# Patient Record
Sex: MALE | Race: WHITE | NOT HISPANIC OR LATINO | Employment: OTHER | URBAN - METROPOLITAN AREA
[De-identification: names, ages, dates, MRNs, and addresses within clinical notes are randomized per-mention and may not be internally consistent; named-entity substitution may affect disease eponyms.]

---

## 2017-01-12 ENCOUNTER — COMMUNICATION - HEALTHEAST (OUTPATIENT)
Dept: INTERNAL MEDICINE | Facility: CLINIC | Age: 57
End: 2017-01-12

## 2017-01-17 ENCOUNTER — RECORDS - HEALTHEAST (OUTPATIENT)
Dept: ADMINISTRATIVE | Facility: OTHER | Age: 57
End: 2017-01-17

## 2017-01-17 ENCOUNTER — MEDICAL CORRESPONDENCE (OUTPATIENT)
Dept: HEALTH INFORMATION MANAGEMENT | Facility: CLINIC | Age: 57
End: 2017-01-17

## 2017-01-17 ENCOUNTER — TRANSFERRED RECORDS (OUTPATIENT)
Dept: HEALTH INFORMATION MANAGEMENT | Facility: CLINIC | Age: 57
End: 2017-01-17

## 2017-02-27 ENCOUNTER — COMMUNICATION - HEALTHEAST (OUTPATIENT)
Dept: INTERNAL MEDICINE | Facility: CLINIC | Age: 57
End: 2017-02-27

## 2017-02-28 ENCOUNTER — COMMUNICATION - HEALTHEAST (OUTPATIENT)
Dept: NEUROSURGERY | Facility: CLINIC | Age: 57
End: 2017-02-28

## 2017-04-11 ENCOUNTER — PRE VISIT (OUTPATIENT)
Dept: NEUROLOGY | Facility: CLINIC | Age: 57
End: 2017-04-11

## 2017-04-11 NOTE — TELEPHONE ENCOUNTER
Records received from [facility].   Included  Office notes: 8/23/12, 4/4/14, 4/22/14, 12/9/16, 1/17/17  Radiology reports: MRI lumbar on 8/28/15   MRI right foot on 8/28/15  Other: EMG on 4/16/12, 1/17/17, labs- [facility]

## 2017-04-11 NOTE — TELEPHONE ENCOUNTER
1.  Date/reason for appt:4/19/17, Neuropathy  2.  Referring provider: RONA LAY  3.  Call to patient (Yes / No - short description):No, referred  4.  Previous care at / records requested from:   [facility]- faxed cover sheet.

## 2017-04-18 ASSESSMENT — ENCOUNTER SYMPTOMS
MYALGIAS: 1
SPEECH CHANGE: 0
NIGHT SWEATS: 1
HEADACHES: 1
POLYPHAGIA: 0
STIFFNESS: 1
MUSCLE WEAKNESS: 1
WEIGHT LOSS: 0
NECK PAIN: 0
TINGLING: 1
ARTHRALGIAS: 1
SEIZURES: 0
LOSS OF CONSCIOUSNESS: 0
DIZZINESS: 0
FATIGUE: 1
DECREASED APPETITE: 0
HALLUCINATIONS: 0
WEAKNESS: 1
CHILLS: 0
JOINT SWELLING: 0
WEIGHT GAIN: 0
INCREASED ENERGY: 1
NUMBNESS: 1
FEVER: 0
BACK PAIN: 1
ALTERED TEMPERATURE REGULATION: 0
DISTURBANCES IN COORDINATION: 0
MUSCLE CRAMPS: 1
MEMORY LOSS: 0
TREMORS: 0
POLYDIPSIA: 0
PARALYSIS: 0

## 2017-04-18 NOTE — TELEPHONE ENCOUNTER
Additional records received from [facility].   Included  Office notes: duplicate office notes  Radiology reports: MR Lumbar on 8/28/15- [facility]   MR right foot on 8/28/15- [facility]

## 2017-04-19 ENCOUNTER — OFFICE VISIT (OUTPATIENT)
Dept: NEUROLOGY | Facility: CLINIC | Age: 57
End: 2017-04-19

## 2017-04-19 VITALS
HEIGHT: 72 IN | BODY MASS INDEX: 31.02 KG/M2 | DIASTOLIC BLOOD PRESSURE: 77 MMHG | WEIGHT: 229 LBS | HEART RATE: 76 BPM | SYSTOLIC BLOOD PRESSURE: 112 MMHG

## 2017-04-19 DIAGNOSIS — M54.17 LUMBOSACRAL RADICULOPATHY AT L4: ICD-10-CM

## 2017-04-19 DIAGNOSIS — G60.9 HEREDITARY AND IDIOPATHIC PERIPHERAL NEUROPATHY: ICD-10-CM

## 2017-04-19 DIAGNOSIS — G56.23 ULNAR NEUROPATHY OF BOTH UPPER EXTREMITIES: ICD-10-CM

## 2017-04-19 DIAGNOSIS — G60.9 HEREDITARY AND IDIOPATHIC PERIPHERAL NEUROPATHY: Primary | ICD-10-CM

## 2017-04-19 LAB
ALBUMIN SERPL-MCNC: 3.6 G/DL (ref 3.4–5)
ALP SERPL-CCNC: 103 U/L (ref 40–150)
ALT SERPL W P-5'-P-CCNC: 28 U/L (ref 0–70)
ANION GAP SERPL CALCULATED.3IONS-SCNC: 8 MMOL/L (ref 3–14)
AST SERPL W P-5'-P-CCNC: 24 U/L (ref 0–45)
BILIRUB SERPL-MCNC: 0.6 MG/DL (ref 0.2–1.3)
BUN SERPL-MCNC: 21 MG/DL (ref 7–30)
CALCIUM SERPL-MCNC: 8.7 MG/DL (ref 8.5–10.1)
CHLORIDE SERPL-SCNC: 106 MMOL/L (ref 94–109)
CO2 SERPL-SCNC: 25 MMOL/L (ref 20–32)
CREAT SERPL-MCNC: 0.84 MG/DL (ref 0.66–1.25)
CRP SERPL-MCNC: 16 MG/L (ref 0–8)
GFR SERPL CREATININE-BSD FRML MDRD: ABNORMAL ML/MIN/1.7M2
GLUCOSE SERPL-MCNC: 104 MG/DL (ref 70–99)
HCV AB SERPL QL IA: NORMAL
POTASSIUM SERPL-SCNC: 3.9 MMOL/L (ref 3.4–5.3)
PROT SERPL-MCNC: 7.6 G/DL (ref 6.8–8.8)
SODIUM SERPL-SCNC: 140 MMOL/L (ref 133–144)
T4 FREE SERPL-MCNC: 1.23 NG/DL (ref 0.76–1.46)
TSH SERPL DL<=0.005 MIU/L-ACNC: 0.21 MU/L (ref 0.4–4)
VIT B12 SERPL-MCNC: 492 PG/ML (ref 193–986)

## 2017-04-19 RX ORDER — PREGABALIN 300 MG/1
CAPSULE ORAL
COMMUNITY
End: 2023-03-08

## 2017-04-19 RX ORDER — CHOLECALCIFEROL (VITAMIN D3) 25 MCG
CAPSULE ORAL
COMMUNITY

## 2017-04-19 RX ORDER — LEVOTHYROXINE SODIUM 137 UG/1
TABLET ORAL
COMMUNITY
End: 2021-12-21

## 2017-04-19 RX ORDER — LOSARTAN POTASSIUM AND HYDROCHLOROTHIAZIDE 12.5; 5 MG/1; MG/1
TABLET ORAL
COMMUNITY
End: 2021-10-04

## 2017-04-19 RX ORDER — SIMVASTATIN 40 MG
TABLET ORAL
COMMUNITY
End: 2021-10-20

## 2017-04-19 ASSESSMENT — PAIN SCALES - GENERAL: PAINLEVEL: MILD PAIN (3)

## 2017-04-19 NOTE — LETTER
2017       RE: Raj Nolan   [facility] [address]     Dear Colleague,    Thank you for referring your patient, Raj Nolan, to the [facility]UR[facility] at [facility]. Please see a copy of my visit note below.    2017      Pb Poon MD   [place] [address]      RE: Rja Nolan   MRN: [id number]   : 1960      Dear Dr. Poon:      I had the pleasure to see Mr. Nolan at the [facility].  Thank you for this referral.      As you know, he is a 56-year-old man whose chief complaint is numbness and pain at the feet. Numbness began about 8-10 years ago, first from the right foot.  Subsequently, the left was involved, probably a couple of years later. During the first few years he did not have prominent pain, to my understanding. It does not appear that the numbness has progressed over the years; it has stayed from the toes to the level of metatarsals, and it has not spread to the skin of the ankles or the lower calf. As for foot pain, he describes a dull, achy sensation principally at the right foot, rather than a burning, sharp or electric one. He has tingling at the toes, including at night.  On , he had extensive surgery at the right foot including repair of metatarsal fractures, and correction of deformity secondary to osteoarthritis.  Surgery helped the foot pain a little bit but did not help the numbness, of course. He underwent fusion of his tarsometatarsal joints at third, fourth and fifth toe.  He had issues with poor wound healing, nonunion, and angular displacement of the joints.  He then had bilateral hip and knee replacements: right hip in 2014, total left knee in 2015, total right knee in 2015, and left hip in 2016. He uses Lyrica 100 mg t.i.d. to keep his painful symptoms under control. He also reports an  occasional sharp pain and numbness in the right anterior thigh.  His right knee does not give away when walking and he has not had any falls.  He has intense back pain.  He has been evaluated for the latter problem twice, first in 2015, and second a few weeks ago. I do not have the records from the last visit.  During the 2015 encounter with a spine surgeon, an MRI of the lumbar spine was performed, which showed substantial lumbar spine stenosis, labeled severe at L3-L4 and L4-L5 and moderate at L2-L3.  It had progressed since 2009.  There was a new right foraminal to extraforaminal L3-L4 disk extrusion compressing the L3 ganglion and severe right foraminal stenosis at L4-L5, multilevel hypertrophic facet arthropathy and epidural soft tissue behind T11 compressing the cord.  At that time, the surgeon did not feel that this needs to be pursued more aggressively and referred him to a podiatrist.     Lastly,he reports numbness in the 4th and 5th digits of bilateral upper extremities.  He does not acknowledge any provocating or alleviating factor for those.  He has a history of bilateral carpal tunnel surgery release in the past.  He does not have numbness in digits 1-3 anymore.  He denies sensory symptoms in the lips, face, trunk, perineum.  He has no bowel or bladder incontinence.  He has no severe neck pain and no cranial nerve symptoms.      REVIEW OF PRIOR WORKUP: He has had 2 EMGs through your office.  I received both for review, one from 2012 and the second one from 2017.  I have the following to comment:   On both studies, there was an asymmetry of the peroneal motor responses.  In 2012, the left was 1.7 mV, the right was 4.6.  Conduction velocities were all normal.  Tibial motor amplitudes were 7.5 millivolts amplitude on the left and 4.8 on the right in 2012 with normal velocities.  In 2017, left tibial motor response was 5.4 millivolts, right 3.7, slightly reduced from prior, and the asymmetry in the peroneal  persists but interestingly the right now is smaller than the left, the left being 6.1, the right 1.8.  Of interest, sural sensory nerve conduction studies were normal on both occasions.  On 2012, they were 11 microvolts left, 13 right.  On 2017, the sural was 9.3 on the right.  Our lab normal value is more than 8.  The left superficial peroneal sensory nerve at the ankle was studied in 2017 and was 8.9 microvolts, the right was absent.  Of note, he has scars on the right foot exactly over the territory of the right peroneal sensory nerve.  Needle EMG in 2012 was relatively unremarkable except for slight changes with large long duration units and slightly reduced recruitment at tibialis anterior, posterior and adductor hallucis.  The 2017 EMG, however, was more remarkable with substantial paraspinous denervation and denervation of the right quadriceps with 3+ fibs and moderately reduced recruitment of large, long duration units.  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  I have a somewhat different opinion (see below).    Workup for neuropathy included TSH, immunofixation and sed rate a number of years ago. TSH was markedly elevated at 23, he was diagnosed with hypothyroidism, received Synthroid treatment and per your notes it is well controlled, but I have not seen a repeat TSH.  ESR and immunofixation were normal. I  did not find a B12, diabetes workup or even a metabolic panel in the medical record.      PAST MEDICAL HISTORY:   Hypertension, hypothyroidism, vitamin D deficiency, hyperlipidemia, bilateral hip and knee replacement, right foot surgery for metatarsal fracture or arthritis, bilateral carpal tunnel release, right quadriceps tendon rupture and repair of a tear in 12/2008.      ALLERGIES:  NKDA.      REVIEW OF SYSTEMS:  A 14-point review is negative except per HPI.      FAMILY HISTORY:  Negative for neuropathy.  His father had osteoarthritis and underwent bilateral hip and knee replacement.   Mother had hypertension.      SOCIAL HISTORY:  No smoking.  Drinks 7-10 alcoholic drinks a week, mixed between beer, wine and cocktails.      CURRENT MEDICATIONS:   As outlined in Epic record.      PHYSICAL EXAMINATION:   ROOMING VITAL SIGNS:  Blood pressure 112/77.  Pulse 76 and regular.  He weighs 103.9 kilos.  Height is 183.  He endorsed mild pain at the foot 3/10.   NEUROLOGIC:  He is awake, alert, oriented x3.  Cranial nerve examination II-XII are normal.  Motor exam shows normal muscle strength, tone and bulk practically throughout except perhaps for the right great toe that shows some difficulty with extension.  It is 4/5.  The toe is very stiff and passive range of motion is limited.  He has obvious exostosis and swelling over the right carpometacarpal joint of the thumb and he has quite some arthritis of the foot as well and limited motion of the toes.  His tone is normal throughout.  Reflexes are 2 in the upper extremities, absent at the knees and right ankle.  Left ankle reflex is trace to 1+.  Plantar responses are bilaterally neutral or flexor.  Sensory exam is slightly abnormal.  Vibration is 4 seconds at the right great toe, but interestingly 10 at the left on more than 1 attempt which is normal.  Vibration at the right medial malleolus is 4 seconds and the left is 7-8 seconds, which is slightly reduced.  Position sensation is normal.  Pin is reduced from the foot to the lower third of the calf in a low stocking distribution bilaterally and he has some trophic changes with shiny skin and loss of hair from the lower third of the calf down to the foot.  There is no red discoloration.  Pulses are normal.  Finger-to-nose is done without dysmetria.  There is no tremor or adventitious movement.  He is able to get up from a chair without assistance.  He can walk a few steps of tandem with reasonable stability.  Romberg sign is negative.      IMPRESSION:     1.  Mild, non-progressive, and probably idiopathic,  sensory neuropathy, limited to the feet, actually not evident on EMG.   2.  Right L3-L4 radiculopathy.   3.  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        I spent about 45-50 minutes with Mr. Nolan, of which more than half was counseling.  He presents with the above list of problems.   In my review of the EMG studies, I noted that the sural sensory responses were normal on both occasions.  I would not call that sensory neuropathy because large sensory fibers appear intact.  The absence of the right superficial peroneal sensory response is likely related to scarring and trauma from surgeries on the top of the foot as opposed to neuropathy. Confirmation of neuropathy diagnosis may require a skin biopsy in this case, but I really do not think it is necessary because I am satisfied with the clinical exam findings.  Importantly, I explained to the patient that almost 50% of those small fiber or mild large-fiber sensory neuropathies that develop over 8-10 years and do not progress are idiopathic, i.e., no good explanation is found.  That said, a few more blood tests should be done, most importantly an oral glucose tolerance test, which I ordered.  I explained the significance of prediabetes and diabetes diagnosis and the relationship to neuropathy.  I also think he should get vitamin B12, a repeat TSH, CRP, BEBETO, SSA, SSB antibodies, hepatitis C serologies and a B6.  I will have the patient do those tests and contact him with the results.  If there is anything abnormal, then we may be able to take specific action.  If there is nothing abnormal, however, I explained to him that no intervention will make the foot numbness disappear.  He will have to live with this symptom and it is likely going to be permanent.  On the other hand, the prognosis of idiopathic sensory neuropathy is generally good and I told him that it is unlikely that the numbness will spread more proximally or cause substantial disability  in the ensuing years.   Hypothyroidism could be a culprit for the neuropathy, but it is probably not a sufficient explanation, because it was corrected and symptoms persist. I also asked the patient to reduce his alcohol intake. I would not say he has alcoholic neuropathy, but I generally advise my patients with neuropathy due to any cause to drink less than 1 alcoholic drink a day.    Symptoms in the hands likely reflect ulnar neuropathies.  I really do not think any further action is required for this right now other than adopting a straight elbow position, avoiding excessive flexion or wearing a protective pad.  He does have lumbar spine stenosis and it is manifesting with right L3-L4 radiculopathy, both clinically and electrodiagnostically.  I told him that I do not want to comment on the necessity of lumbar spine surgery and this should be discussed between him and his surgeon.  I told him, however, that I do not expect any procedure done at his lumbar spine to provide relief of his foot paresthesias.  He understands that.      Thank you for allowing me to participate in his care.      Sincerely,       Pierre Soler MD      D: 2017 09:52   T: 2017 15:26   MT: AIDE      Name:     LEEROY NG   MRN:      [id number]        Account:      [id number]   :      1960           Service Date: 2017      Document: [id number]

## 2017-04-19 NOTE — TELEPHONE ENCOUNTER
Spoke to Joselin at [facility]- Lumbar images belong to [facility].  Spoke to Divine at [facility] and she state they need pt to sign ANSHUL, Please have pt sign ANSHUL for MR Lumbar.

## 2017-04-19 NOTE — PROGRESS NOTES
2017      Pb Poon MD   [place] [address]      RE: Raj Nolan   MRN: [id number]   : 1960      Dear Dr. Poon:      I had the pleasure to see Mr. Nolan at the [facility].  Thank you for this referral.      As you know, he is a 56-year-old man whose chief complaint is numbness and pain at the feet. Numbness began about 8-10 years ago, first from the right foot.  Subsequently, the left was involved, probably a couple of years later. During the first few years he did not have prominent pain, to my understanding. It does not appear that the numbness has progressed over the years; it has stayed from the toes to the level of metatarsals, and it has not spread to the skin of the ankles or the lower calf. As for foot pain, he describes a dull, achy sensation principally at the right foot, rather than a burning, sharp or electric one. He has tingling at the toes, including at night.  On , he had extensive surgery at the right foot including repair of metatarsal fractures, and correction of deformity secondary to osteoarthritis.  Surgery helped the foot pain a little bit but did not help the numbness, of course. He underwent fusion of his tarsometatarsal joints at third, fourth and fifth toe.  He had issues with poor wound healing, nonunion, and angular displacement of the joints.  He then had bilateral hip and knee replacements: right hip in 2014, total left knee in 2015, total right knee in 2015, and left hip in 2016. He uses Lyrica 100 mg t.i.d. to keep his painful symptoms under control. He also reports an occasional sharp pain and numbness in the right anterior thigh.  His right knee does not give away when walking and he has not had any falls.  He has intense back pain.  He has been evaluated for the latter problem twice, first in , and second a few weeks ago. I do not have the  records from the last visit.  During the 2015 encounter with a spine surgeon, an MRI of the lumbar spine was performed, which showed substantial lumbar spine stenosis, labeled severe at L3-L4 and L4-L5 and moderate at L2-L3.  It had progressed since 2009.  There was a new right foraminal to extraforaminal L3-L4 disk extrusion compressing the L3 ganglion and severe right foraminal stenosis at L4-L5, multilevel hypertrophic facet arthropathy and epidural soft tissue behind T11 compressing the cord.  At that time, the surgeon did not feel that this needs to be pursued more aggressively and referred him to a podiatrist.     Lastly,he reports numbness in the 4th and 5th digits of bilateral upper extremities.  He does not acknowledge any provocating or alleviating factor for those.  He has a history of bilateral carpal tunnel surgery release in the past.  He does not have numbness in digits 1-3 anymore.  He denies sensory symptoms in the lips, face, trunk, perineum.  He has no bowel or bladder incontinence.  He has no severe neck pain and no cranial nerve symptoms.      REVIEW OF PRIOR WORKUP: He has had 2 EMGs through your office.  I received both for review, one from 2012 and the second one from 2017.  I have the following to comment:   On both studies, there was an asymmetry of the peroneal motor responses.  In 2012, the left was 1.7 mV, the right was 4.6.  Conduction velocities were all normal.  Tibial motor amplitudes were 7.5 millivolts amplitude on the left and 4.8 on the right in 2012 with normal velocities.  In 2017, left tibial motor response was 5.4 millivolts, right 3.7, slightly reduced from prior, and the asymmetry in the peroneal persists but interestingly the right now is smaller than the left, the left being 6.1, the right 1.8.  Of interest, sural sensory nerve conduction studies were normal on both occasions.  On 2012, they were 11 microvolts left, 13 right.  On 2017, the sural was 9.3 on the right.  Our  lab normal value is more than 8.  The left superficial peroneal sensory nerve at the ankle was studied in 2017 and was 8.9 microvolts, the right was absent.  Of note, he has scars on the right foot exactly over the territory of the right peroneal sensory nerve.  Needle EMG in 2012 was relatively unremarkable except for slight changes with large long duration units and slightly reduced recruitment at tibialis anterior, posterior and adductor hallucis.  The 2017 EMG, however, was more remarkable with substantial paraspinous denervation and denervation of the right quadriceps with 3+ fibs and moderately reduced recruitment of large, long duration units.  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  I have a somewhat different opinion (see below).    Workup for neuropathy included TSH, immunofixation and sed rate a number of years ago. TSH was markedly elevated at 23, he was diagnosed with hypothyroidism, received Synthroid treatment and per your notes it is well controlled, but I have not seen a repeat TSH.  ESR and immunofixation were normal. I  did not find a B12, diabetes workup or even a metabolic panel in the medical record.      PAST MEDICAL HISTORY:   Hypertension, hypothyroidism, vitamin D deficiency, hyperlipidemia, bilateral hip and knee replacement, right foot surgery for metatarsal fracture or arthritis, bilateral carpal tunnel release, right quadriceps tendon rupture and repair of a tear in 12/2008.      ALLERGIES:  NKDA.      REVIEW OF SYSTEMS:  A 14-point review is negative except per HPI.      FAMILY HISTORY:  Negative for neuropathy.  His father had osteoarthritis and underwent bilateral hip and knee replacement.  Mother had hypertension.      SOCIAL HISTORY:  No smoking.  Drinks 7-10 alcoholic drinks a week, mixed between beer, wine and cocktails.      CURRENT MEDICATIONS:   As outlined in Epic record.      PHYSICAL EXAMINATION:   ROOMING VITAL SIGNS:  Blood pressure 112/77.  Pulse 76 and  regular.  He weighs 103.9 kilos.  Height is 183.  He endorsed mild pain at the foot 3/10.   NEUROLOGIC:  He is awake, alert, oriented x3.  Cranial nerve examination II-XII are normal.  Motor exam shows normal muscle strength, tone and bulk practically throughout except perhaps for the right great toe that shows some difficulty with extension.  It is 4/5.  The toe is very stiff and passive range of motion is limited.  He has obvious exostosis and swelling over the right carpometacarpal joint of the thumb and he has quite some arthritis of the foot as well and limited motion of the toes.  His tone is normal throughout.  Reflexes are 2 in the upper extremities, absent at the knees and right ankle.  Left ankle reflex is trace to 1+.  Plantar responses are bilaterally neutral or flexor.  Sensory exam is slightly abnormal.  Vibration is 4 seconds at the right great toe, but interestingly 10 at the left on more than 1 attempt which is normal.  Vibration at the right medial malleolus is 4 seconds and the left is 7-8 seconds, which is slightly reduced.  Position sensation is normal.  Pin is reduced from the foot to the lower third of the calf in a low stocking distribution bilaterally and he has some trophic changes with shiny skin and loss of hair from the lower third of the calf down to the foot.  There is no red discoloration.  Pulses are normal.  Finger-to-nose is done without dysmetria.  There is no tremor or adventitious movement.  He is able to get up from a chair without assistance.  He can walk a few steps of tandem with reasonable stability.  Romberg sign is negative.      IMPRESSION:     1.  Mild, non-progressive, and probably idiopathic, sensory neuropathy, limited to the feet, actually not evident on EMG.   2.  Right L3-L4 radiculopathy.   3.  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        I spent about 45-50 minutes with Mr. Nolan, of which more than half was counseling.  He presents with the  above list of problems.   In my review of the EMG studies, I noted that the sural sensory responses were normal on both occasions.  I would not call that sensory neuropathy because large sensory fibers appear intact.  The absence of the right superficial peroneal sensory response is likely related to scarring and trauma from surgeries on the top of the foot as opposed to neuropathy. Confirmation of neuropathy diagnosis may require a skin biopsy in this case, but I really do not think it is necessary because I am satisfied with the clinical exam findings.  Importantly, I explained to the patient that almost 50% of those small fiber or mild large-fiber sensory neuropathies that develop over 8-10 years and do not progress are idiopathic, i.e., no good explanation is found.  That said, a few more blood tests should be done, most importantly an oral glucose tolerance test, which I ordered.  I explained the significance of prediabetes and diabetes diagnosis and the relationship to neuropathy.  I also think he should get vitamin B12, a repeat TSH, CRP, BEBETO, SSA, SSB antibodies, hepatitis C serologies and a B6.  I will have the patient do those tests and contact him with the results.  If there is anything abnormal, then we may be able to take specific action.  If there is nothing abnormal, however, I explained to him that no intervention will make the foot numbness disappear.  He will have to live with this symptom and it is likely going to be permanent.  On the other hand, the prognosis of idiopathic sensory neuropathy is generally good and I told him that it is unlikely that the numbness will spread more proximally or cause substantial disability in the ensuing years.   Hypothyroidism could be a culprit for the neuropathy, but it is probably not a sufficient explanation, because it was corrected and symptoms persist. I also asked the patient to reduce his alcohol intake. I would not say he has alcoholic neuropathy, but I  generally advise my patients with neuropathy due to any cause to drink less than 1 alcoholic drink a day.    Symptoms in the hands likely reflect ulnar neuropathies.  I really do not think any further action is required for this right now other than adopting a straight elbow position, avoiding excessive flexion or wearing a protective pad.  He does have lumbar spine stenosis and it is manifesting with right L3-L4 radiculopathy, both clinically and electrodiagnostically.  I told him that I do not want to comment on the necessity of lumbar spine surgery and this should be discussed between him and his surgeon.  I told him, however, that I do not expect any procedure done at his lumbar spine to provide relief of his foot paresthesias.  He understands that.      Thank you for allowing me to participate in his care.      Sincerely,       MD LEA Polo MD             D: 2017 09:52   T: 2017 15:26   MT: AIDE      Name:     LEEROY NG   MRN:      [id number]        Account:      [id number]   :      1960           Service Date: 2017      Document: [id number]

## 2017-04-19 NOTE — MR AVS SNAPSHOT
After Visit Summary   4/19/2017    Raj Nolan    MRN: [id number]           Patient Information     Date Of Birth          1960        Visit Information        Provider Department      4/19/2017 7:50 AM Pierre Soler MD [facility]        Today's Diagnoses     Hereditary and idiopathic peripheral neuropathy    -  1      Care Instructions    Thank you for choosing [facility] for your care.    The physician's recommendations are as follows:    1: You probably have mild neuropathy in your feet  2: Follow up with Dr. Poon  3: Have labs drawn today  4: Complete an Oral Glucose Tolerance Test at your convenience when you are fasting  5: Please contact your Spine Surgeon to address your back pain issues  6: Your numbness in your fingers is from nerve impingement at your elbows.  Try to keep your arms straight or use protective pads on your elbows.    Please do not hesitate to call with questions or concerns [phone], choose option 3 to speak to a nurse.              Follow-ups after your visit        Future tests that were ordered for you today     Open Future Orders        Priority Expected Expires Ordered    Glucose tolerance std non preg Routine  4/19/2018 4/19/2017    Vitamin B12 Routine  4/19/2018 4/19/2017    TSH with free T4 reflex Routine  4/19/2018 4/19/2017    Comprehensive metabolic panel Routine  4/19/2018 4/19/2017    CRP inflammation Routine  4/19/2018 4/19/2017    Antinuclear antibody screen by EIA Routine  4/19/2018 4/19/2017    SSA Ro LARS Antibody IgG Routine  4/19/2018 4/19/2017    SSB La LARS Antibody IgG Routine  4/19/2018 4/19/2017    Hepatitis C antibody Routine  4/19/2018 4/19/2017    Vitamin B6 Routine  4/19/2018 4/19/2017            Who to contact     Please call your clinic at [phone] to:    Ask questions about your health    Make or cancel appointments    Discuss your medicines    Learn about your test results    Speak to your  doctor   If you have compliments or concerns about an experience at your clinic, or if you wish to file a complaint, please contact [facility] Patient Relations at [phone] or email us at [email or web address]         Additional Information About Your Visit        MyChart Information     [employer] [employer] gives you secure access to your electronic health record. If you see a primary care provider, you can also send messages to your care team and make appointments. If you have questions, please call your primary care clinic.  If you do not have a primary care provider, please call [phone] and they will assist you.      [employer] is an electronic gateway that provides easy, online access to your medical records. With [employer], you can request a clinic appointment, read your test results, renew a prescription or communicate with your care team.     To access your existing account, please contact your [facility] or call [phone] for assistance.        Care EveryWhere ID     This is your Care EveryWhere ID. This could be used by other organizations to access your [facility] medical records  [id number]        Your Vitals Were     Pulse Height BMI (Body Mass Index)             76 1.829 m (6') 31.06 kg/m2          Blood Pressure from Last 3 Encounters:   04/19/17 112/77    Weight from Last 3 Encounters:   04/19/17 103.9 kg (229 lb)               Primary Care Provider    None       No address on file        Thank you!     Thank you for choosing [facility]  for your care. Our goal is always to provide you with excellent care. Hearing back from our patients is one way we can continue to improve our services. Please take a few minutes to complete the written survey that you may receive in the mail after your visit with us. Thank you!             Your Updated Medication List - Protect others around you: Learn how to safely use, store and throw away your  medicines at www.disposemymeds.org.          This list is accurate as of: 4/19/17  8:43 AM.  Always use your most recent med list.                   Brand Name Dispense Instructions for use    ASPIRIN ADULT LOW DOSE 81 MG EC tablet   Generic drug:  aspirin          CoQ-10 100 MG Caps          levothyroxine 137 MCG tablet    SYNTHROID/LEVOTHROID         losartan-hydrochlorothiazide 50-12.5 MG per tablet    HYZAAR         LYRICA 300 MG Caps capsule   Generic drug:  pregabalin          magnesium 100 MG Caps          simvastatin 40 MG tablet    ZOCOR         Vitamin D-3 1000 UNITS Caps

## 2017-04-19 NOTE — PATIENT INSTRUCTIONS
Thank you for choosing [facility] for your care.    The physician's recommendations are as follows:    1: You probably have mild neuropathy in your feet  2: Follow up with Dr. Poon  3: Have labs drawn today  4: Complete an Oral Glucose Tolerance Test at your convenience when you are fasting  5: Please contact your Spine Surgeon to address your back pain issues  6: Your numbness in your fingers is from nerve impingement at your elbows.  Try to keep your arms straight or use protective pads on your elbows.    Please do not hesitate to call with questions or concerns [phone], choose option 3 to speak to a nurse.

## 2017-04-20 LAB
ANA SER QL IA: NORMAL
ENA SS-A IGG SER IA-ACNC: NORMAL AI (ref 0–0.9)
ENA SS-B IGG SER IA-ACNC: NORMAL AI (ref 0–0.9)

## 2017-04-20 NOTE — PROGRESS NOTES
Answers for HPI/ROS submitted by the patient on 4/18/2017   General Symptoms: Yes  Skin Symptoms: No  HENT Symptoms: No  EYE SYMPTOMS: No  HEART SYMPTOMS: No  LUNG SYMPTOMS: No  INTESTINAL SYMPTOMS: No  URINARY SYMPTOMS: No  REPRODUCTIVE SYMPTOMS: No  SKELETAL SYMPTOMS: Yes  BLOOD SYMPTOMS: No  NERVOUS SYSTEM SYMPTOMS: Yes  MENTAL HEALTH SYMPTOMS: No  Fever: No  Loss of appetite: No  Weight loss: No  Weight gain: No  Fatigue: Yes  Night sweats: Yes  Chills: No  Increased stress: Yes  Excessive hunger: No  Excessive thirst: No  Feeling hot or cold when others believe the temperature is normal: No  Loss of height: Yes  Post-operative complications: No  Surgical site pain: No  Hallucinations: No  Change in or Loss of Energy: Yes  Hyperactivity: No  Confusion: No  Back pain: Yes  Muscle aches: Yes  Neck pain: No  Swollen joints: No  Joint pain: Yes  Bone pain: Yes  Muscle cramps: Yes  Muscle weakness: Yes  Joint stiffness: Yes  Bone fracture: No  Trouble with coordination: No  Dizziness or trouble with balance: No  Fainting or black-out spells: No  Memory loss: No  Headache: Yes  Seizures: No  Speech problems: No  Tingling: Yes  Tremor: No  Weakness: Yes  Difficulty walking: Yes  Paralysis: No  Numbness: Yes

## 2017-04-22 LAB — VIT B6 SERPL-MCNC: 53.4 NG/ML

## 2017-05-01 ENCOUNTER — COMMUNICATION - HEALTHEAST (OUTPATIENT)
Dept: INTERNAL MEDICINE | Facility: CLINIC | Age: 57
End: 2017-05-01

## 2017-05-01 ENCOUNTER — TRANSFERRED RECORDS (OUTPATIENT)
Dept: HEALTH INFORMATION MANAGEMENT | Facility: CLINIC | Age: 57
End: 2017-05-01

## 2017-05-01 ENCOUNTER — AMBULATORY - HEALTHEAST (OUTPATIENT)
Dept: LAB | Facility: CLINIC | Age: 57
End: 2017-05-01

## 2017-05-01 DIAGNOSIS — G60.9 UNSPECIFIED HEREDITARY AND IDIOPATHIC PERIPHERAL NEUROPATHY: ICD-10-CM

## 2017-06-13 ENCOUNTER — COMMUNICATION - HEALTHEAST (OUTPATIENT)
Dept: INTERNAL MEDICINE | Facility: CLINIC | Age: 57
End: 2017-06-13

## 2017-06-15 ENCOUNTER — RECORDS - HEALTHEAST (OUTPATIENT)
Dept: ADMINISTRATIVE | Facility: OTHER | Age: 57
End: 2017-06-15

## 2017-08-09 ENCOUNTER — COMMUNICATION - HEALTHEAST (OUTPATIENT)
Dept: INTERNAL MEDICINE | Facility: CLINIC | Age: 57
End: 2017-08-09

## 2017-08-28 ENCOUNTER — COMMUNICATION - HEALTHEAST (OUTPATIENT)
Dept: INTERNAL MEDICINE | Facility: CLINIC | Age: 57
End: 2017-08-28

## 2017-08-28 DIAGNOSIS — E78.5 HYPERLIPIDEMIA: ICD-10-CM

## 2017-09-05 ENCOUNTER — RECORDS - HEALTHEAST (OUTPATIENT)
Dept: ADMINISTRATIVE | Facility: OTHER | Age: 57
End: 2017-09-05

## 2017-10-07 ENCOUNTER — COMMUNICATION - HEALTHEAST (OUTPATIENT)
Dept: INTERNAL MEDICINE | Facility: CLINIC | Age: 57
End: 2017-10-07

## 2017-10-07 DIAGNOSIS — E78.5 HYPERLIPIDEMIA: ICD-10-CM

## 2017-10-07 DIAGNOSIS — I10 HTN (HYPERTENSION): ICD-10-CM

## 2017-10-07 DIAGNOSIS — E03.9 HYPOTHYROID: ICD-10-CM

## 2017-10-08 ENCOUNTER — AMBULATORY - HEALTHEAST (OUTPATIENT)
Dept: INTERNAL MEDICINE | Facility: CLINIC | Age: 57
End: 2017-10-08

## 2017-11-30 ENCOUNTER — COMMUNICATION - HEALTHEAST (OUTPATIENT)
Dept: INTERNAL MEDICINE | Facility: CLINIC | Age: 57
End: 2017-11-30

## 2017-11-30 DIAGNOSIS — E03.9 HYPOTHYROIDISM: ICD-10-CM

## 2017-11-30 DIAGNOSIS — E78.5 HYPERLIPIDEMIA: ICD-10-CM

## 2017-11-30 DIAGNOSIS — R52 PAIN: ICD-10-CM

## 2017-11-30 DIAGNOSIS — I10 HTN (HYPERTENSION): ICD-10-CM

## 2017-12-18 ENCOUNTER — RECORDS - HEALTHEAST (OUTPATIENT)
Dept: ADMINISTRATIVE | Facility: OTHER | Age: 57
End: 2017-12-18

## 2018-01-19 ENCOUNTER — RECORDS - HEALTHEAST (OUTPATIENT)
Dept: ADMINISTRATIVE | Facility: OTHER | Age: 58
End: 2018-01-19

## 2018-03-09 ENCOUNTER — COMMUNICATION - HEALTHEAST (OUTPATIENT)
Dept: INTERNAL MEDICINE | Facility: CLINIC | Age: 58
End: 2018-03-09

## 2018-03-09 DIAGNOSIS — I10 HTN (HYPERTENSION): ICD-10-CM

## 2018-03-09 DIAGNOSIS — R52 PAIN: ICD-10-CM

## 2018-03-27 ENCOUNTER — OFFICE VISIT - HEALTHEAST (OUTPATIENT)
Dept: INTERNAL MEDICINE | Facility: CLINIC | Age: 58
End: 2018-03-27

## 2018-03-27 DIAGNOSIS — E78.00 HYPERCHOLESTEROLEMIA: ICD-10-CM

## 2018-03-27 DIAGNOSIS — I10 ESSENTIAL HYPERTENSION: ICD-10-CM

## 2018-03-27 DIAGNOSIS — M54.9 MECHANICAL BACK PAIN: ICD-10-CM

## 2018-03-27 ASSESSMENT — MIFFLIN-ST. JEOR: SCORE: 1906.23

## 2018-04-02 ENCOUNTER — RECORDS - HEALTHEAST (OUTPATIENT)
Dept: ADMINISTRATIVE | Facility: OTHER | Age: 58
End: 2018-04-02

## 2018-04-20 ENCOUNTER — AMBULATORY - HEALTHEAST (OUTPATIENT)
Dept: INTERNAL MEDICINE | Facility: CLINIC | Age: 58
End: 2018-04-20

## 2018-04-23 ENCOUNTER — OFFICE VISIT - HEALTHEAST (OUTPATIENT)
Dept: INTERNAL MEDICINE | Facility: CLINIC | Age: 58
End: 2018-04-23

## 2018-04-23 DIAGNOSIS — G47.00 INSOMNIA: ICD-10-CM

## 2018-04-23 DIAGNOSIS — B35.1 ONYCHOMYCOSIS: ICD-10-CM

## 2018-04-23 DIAGNOSIS — E78.5 HYPERLIPIDEMIA: ICD-10-CM

## 2018-04-23 DIAGNOSIS — Z00.00 ROUTINE GENERAL MEDICAL EXAMINATION AT A HEALTH CARE FACILITY: ICD-10-CM

## 2018-04-23 DIAGNOSIS — R73.09 ELEVATED HEMOGLOBIN A1C: ICD-10-CM

## 2018-04-23 DIAGNOSIS — I10 ESSENTIAL HYPERTENSION: ICD-10-CM

## 2018-04-23 DIAGNOSIS — G60.9 HEREDITARY AND IDIOPATHIC PERIPHERAL NEUROPATHY: ICD-10-CM

## 2018-04-23 DIAGNOSIS — G56.00 CARPAL TUNNEL SYNDROME: ICD-10-CM

## 2018-04-23 DIAGNOSIS — E78.00 HYPERCHOLESTEROLEMIA: ICD-10-CM

## 2018-04-23 DIAGNOSIS — M19.079 OSTEOARTHRITIS OF ANKLE OR FOOT: ICD-10-CM

## 2018-04-23 DIAGNOSIS — Z96.649 STATUS POST TOTAL REPLACEMENT OF HIP, UNSPECIFIED LATERALITY: ICD-10-CM

## 2018-04-23 DIAGNOSIS — E34.9 TESTOSTERONE DEFICIENCY: ICD-10-CM

## 2018-04-23 DIAGNOSIS — E03.8 OTHER SPECIFIED HYPOTHYROIDISM: ICD-10-CM

## 2018-04-23 LAB
ALBUMIN SERPL-MCNC: 3.9 G/DL (ref 3.5–5)
ALP SERPL-CCNC: 94 U/L (ref 45–120)
ALT SERPL W P-5'-P-CCNC: 32 U/L (ref 0–45)
ANION GAP SERPL CALCULATED.3IONS-SCNC: 11 MMOL/L (ref 5–18)
AST SERPL W P-5'-P-CCNC: 33 U/L (ref 0–40)
BILIRUB SERPL-MCNC: 0.6 MG/DL (ref 0–1)
BUN SERPL-MCNC: 21 MG/DL (ref 8–22)
CALCIUM SERPL-MCNC: 10 MG/DL (ref 8.5–10.5)
CHLORIDE BLD-SCNC: 109 MMOL/L (ref 98–107)
CHOLEST SERPL-MCNC: 155 MG/DL
CO2 SERPL-SCNC: 23 MMOL/L (ref 22–31)
CREAT SERPL-MCNC: 0.97 MG/DL (ref 0.7–1.3)
ERYTHROCYTE [DISTWIDTH] IN BLOOD BY AUTOMATED COUNT: 13.1 % (ref 11–14.5)
FASTING STATUS PATIENT QL REPORTED: YES
GFR SERPL CREATININE-BSD FRML MDRD: >60 ML/MIN/1.73M2
GLUCOSE BLD-MCNC: 99 MG/DL (ref 70–125)
HBA1C MFR BLD: 6.7 % (ref 3.5–6)
HCT VFR BLD AUTO: 43.8 % (ref 40–54)
HDLC SERPL-MCNC: 47 MG/DL
HGB BLD-MCNC: 14.7 G/DL (ref 14–18)
LDLC SERPL CALC-MCNC: 88 MG/DL
MCH RBC QN AUTO: 31 PG (ref 27–34)
MCHC RBC AUTO-ENTMCNC: 33.5 G/DL (ref 32–36)
MCV RBC AUTO: 93 FL (ref 80–100)
PLATELET # BLD AUTO: 215 THOU/UL (ref 140–440)
PMV BLD AUTO: 7 FL (ref 7–10)
POTASSIUM BLD-SCNC: 4.7 MMOL/L (ref 3.5–5)
PROT SERPL-MCNC: 7.5 G/DL (ref 6–8)
PSA SERPL-MCNC: 1.9 NG/ML (ref 0–3.5)
RBC # BLD AUTO: 4.74 MILL/UL (ref 4.4–6.2)
SODIUM SERPL-SCNC: 143 MMOL/L (ref 136–145)
TRIGL SERPL-MCNC: 102 MG/DL
WBC: 7.4 THOU/UL (ref 4–11)

## 2018-04-23 ASSESSMENT — MIFFLIN-ST. JEOR: SCORE: 1924.37

## 2018-04-25 LAB — TESTOST SERPL-MCNC: 285 NG/DL (ref 221–716)

## 2018-05-02 ENCOUNTER — COMMUNICATION - HEALTHEAST (OUTPATIENT)
Dept: INTERNAL MEDICINE | Facility: CLINIC | Age: 58
End: 2018-05-02

## 2018-06-14 ENCOUNTER — RECORDS - HEALTHEAST (OUTPATIENT)
Dept: ADMINISTRATIVE | Facility: OTHER | Age: 58
End: 2018-06-14

## 2018-06-18 ENCOUNTER — COMMUNICATION - HEALTHEAST (OUTPATIENT)
Dept: INTERNAL MEDICINE | Facility: CLINIC | Age: 58
End: 2018-06-18

## 2018-06-18 DIAGNOSIS — I10 HTN (HYPERTENSION): ICD-10-CM

## 2018-07-11 ENCOUNTER — COMMUNICATION - HEALTHEAST (OUTPATIENT)
Dept: INTERNAL MEDICINE | Facility: CLINIC | Age: 58
End: 2018-07-11

## 2018-07-11 DIAGNOSIS — E03.9 HYPOTHYROID: ICD-10-CM

## 2018-07-11 DIAGNOSIS — R52 PAIN: ICD-10-CM

## 2018-09-11 ENCOUNTER — COMMUNICATION - HEALTHEAST (OUTPATIENT)
Dept: INTERNAL MEDICINE | Facility: CLINIC | Age: 58
End: 2018-09-11

## 2018-09-11 DIAGNOSIS — E78.5 HYPERLIPIDEMIA: ICD-10-CM

## 2018-09-21 ENCOUNTER — RECORDS - HEALTHEAST (OUTPATIENT)
Dept: ADMINISTRATIVE | Facility: OTHER | Age: 58
End: 2018-09-21

## 2018-10-04 ENCOUNTER — COMMUNICATION - HEALTHEAST (OUTPATIENT)
Dept: INTERNAL MEDICINE | Facility: CLINIC | Age: 58
End: 2018-10-04

## 2018-10-04 DIAGNOSIS — E78.5 HYPERLIPIDEMIA: ICD-10-CM

## 2018-10-04 DIAGNOSIS — I10 HTN (HYPERTENSION): ICD-10-CM

## 2018-10-04 DIAGNOSIS — E03.9 HYPOTHYROID: ICD-10-CM

## 2018-10-04 DIAGNOSIS — E03.9 HYPOTHYROIDISM: ICD-10-CM

## 2018-10-04 DIAGNOSIS — R52 PAIN: ICD-10-CM

## 2018-10-05 ENCOUNTER — COMMUNICATION - HEALTHEAST (OUTPATIENT)
Dept: INTERNAL MEDICINE | Facility: CLINIC | Age: 58
End: 2018-10-05

## 2018-10-25 ENCOUNTER — RECORDS - HEALTHEAST (OUTPATIENT)
Dept: ADMINISTRATIVE | Facility: OTHER | Age: 58
End: 2018-10-25

## 2018-12-18 ENCOUNTER — COMMUNICATION - HEALTHEAST (OUTPATIENT)
Dept: INTERNAL MEDICINE | Facility: CLINIC | Age: 58
End: 2018-12-18

## 2018-12-18 DIAGNOSIS — R52 PAIN: ICD-10-CM

## 2019-01-04 ENCOUNTER — RECORDS - HEALTHEAST (OUTPATIENT)
Dept: ADMINISTRATIVE | Facility: OTHER | Age: 59
End: 2019-01-04

## 2019-02-27 ENCOUNTER — COMMUNICATION - HEALTHEAST (OUTPATIENT)
Dept: INTERNAL MEDICINE | Facility: CLINIC | Age: 59
End: 2019-02-27

## 2019-02-27 DIAGNOSIS — E78.5 HYPERLIPIDEMIA: ICD-10-CM

## 2019-02-27 DIAGNOSIS — I10 HTN (HYPERTENSION): ICD-10-CM

## 2019-03-08 ENCOUNTER — COMMUNICATION - HEALTHEAST (OUTPATIENT)
Dept: INTERNAL MEDICINE | Facility: CLINIC | Age: 59
End: 2019-03-08

## 2019-03-08 ENCOUNTER — RECORDS - HEALTHEAST (OUTPATIENT)
Dept: ADMINISTRATIVE | Facility: OTHER | Age: 59
End: 2019-03-08

## 2019-03-08 DIAGNOSIS — R52 PAIN: ICD-10-CM

## 2019-03-11 ENCOUNTER — COMMUNICATION - HEALTHEAST (OUTPATIENT)
Dept: INTERNAL MEDICINE | Facility: CLINIC | Age: 59
End: 2019-03-11

## 2019-03-11 DIAGNOSIS — R52 PAIN: ICD-10-CM

## 2019-03-22 ENCOUNTER — COMMUNICATION - HEALTHEAST (OUTPATIENT)
Dept: INTERNAL MEDICINE | Facility: CLINIC | Age: 59
End: 2019-03-22

## 2019-03-22 DIAGNOSIS — I10 HTN (HYPERTENSION): ICD-10-CM

## 2019-03-27 ENCOUNTER — COMMUNICATION - HEALTHEAST (OUTPATIENT)
Dept: INTERNAL MEDICINE | Facility: CLINIC | Age: 59
End: 2019-03-27

## 2019-03-27 DIAGNOSIS — I10 HTN (HYPERTENSION): ICD-10-CM

## 2019-03-27 DIAGNOSIS — R52 PAIN: ICD-10-CM

## 2019-03-27 DIAGNOSIS — E78.5 HYPERLIPIDEMIA: ICD-10-CM

## 2019-03-30 ENCOUNTER — COMMUNICATION - HEALTHEAST (OUTPATIENT)
Dept: INTERNAL MEDICINE | Facility: CLINIC | Age: 59
End: 2019-03-30

## 2019-03-30 DIAGNOSIS — E78.5 HYPERLIPIDEMIA: ICD-10-CM

## 2019-04-05 ENCOUNTER — COMMUNICATION - HEALTHEAST (OUTPATIENT)
Dept: INTERNAL MEDICINE | Facility: CLINIC | Age: 59
End: 2019-04-05

## 2019-04-05 ENCOUNTER — RECORDS - HEALTHEAST (OUTPATIENT)
Dept: ADMINISTRATIVE | Facility: OTHER | Age: 59
End: 2019-04-05

## 2019-05-02 ENCOUNTER — COMMUNICATION - HEALTHEAST (OUTPATIENT)
Dept: INTERNAL MEDICINE | Facility: CLINIC | Age: 59
End: 2019-05-02

## 2019-05-02 DIAGNOSIS — M54.9 MECHANICAL BACK PAIN: ICD-10-CM

## 2019-05-07 ENCOUNTER — COMMUNICATION - HEALTHEAST (OUTPATIENT)
Dept: INTERNAL MEDICINE | Facility: CLINIC | Age: 59
End: 2019-05-07

## 2019-05-07 DIAGNOSIS — M19.079 OSTEOARTHRITIS OF ANKLE OR FOOT, UNSPECIFIED LATERALITY: ICD-10-CM

## 2019-05-08 ENCOUNTER — COMMUNICATION - HEALTHEAST (OUTPATIENT)
Dept: INTERNAL MEDICINE | Facility: CLINIC | Age: 59
End: 2019-05-08

## 2019-05-08 DIAGNOSIS — I10 HTN (HYPERTENSION): ICD-10-CM

## 2019-05-23 ENCOUNTER — COMMUNICATION - HEALTHEAST (OUTPATIENT)
Dept: INTERNAL MEDICINE | Facility: CLINIC | Age: 59
End: 2019-05-23

## 2019-05-23 DIAGNOSIS — G60.9 HEREDITARY AND IDIOPATHIC PERIPHERAL NEUROPATHY: ICD-10-CM

## 2019-06-04 ENCOUNTER — COMMUNICATION - HEALTHEAST (OUTPATIENT)
Dept: INTERNAL MEDICINE | Facility: CLINIC | Age: 59
End: 2019-06-04

## 2019-06-04 DIAGNOSIS — M19.079 OSTEOARTHRITIS OF ANKLE OR FOOT, UNSPECIFIED LATERALITY: ICD-10-CM

## 2019-06-14 ENCOUNTER — RECORDS - HEALTHEAST (OUTPATIENT)
Dept: ADMINISTRATIVE | Facility: OTHER | Age: 59
End: 2019-06-14

## 2019-06-28 ENCOUNTER — COMMUNICATION - HEALTHEAST (OUTPATIENT)
Dept: INTERNAL MEDICINE | Facility: CLINIC | Age: 59
End: 2019-06-28

## 2019-06-28 DIAGNOSIS — E78.5 HYPERLIPIDEMIA: ICD-10-CM

## 2019-07-09 ENCOUNTER — COMMUNICATION - HEALTHEAST (OUTPATIENT)
Dept: INTERNAL MEDICINE | Facility: CLINIC | Age: 59
End: 2019-07-09

## 2019-07-09 DIAGNOSIS — M54.9 MECHANICAL BACK PAIN: ICD-10-CM

## 2019-08-01 ENCOUNTER — COMMUNICATION - HEALTHEAST (OUTPATIENT)
Dept: INTERNAL MEDICINE | Facility: CLINIC | Age: 59
End: 2019-08-01

## 2019-08-01 DIAGNOSIS — I10 HTN (HYPERTENSION): ICD-10-CM

## 2019-08-01 DIAGNOSIS — M54.9 MECHANICAL BACK PAIN: ICD-10-CM

## 2019-08-01 DIAGNOSIS — G60.9 HEREDITARY AND IDIOPATHIC PERIPHERAL NEUROPATHY: ICD-10-CM

## 2019-09-08 ENCOUNTER — COMMUNICATION - HEALTHEAST (OUTPATIENT)
Dept: INTERNAL MEDICINE | Facility: CLINIC | Age: 59
End: 2019-09-08

## 2019-09-08 DIAGNOSIS — M19.079 OSTEOARTHRITIS OF ANKLE OR FOOT, UNSPECIFIED LATERALITY: ICD-10-CM

## 2019-09-26 ENCOUNTER — COMMUNICATION - HEALTHEAST (OUTPATIENT)
Dept: INTERNAL MEDICINE | Facility: CLINIC | Age: 59
End: 2019-09-26

## 2019-09-26 DIAGNOSIS — E78.5 HYPERLIPIDEMIA: ICD-10-CM

## 2019-12-10 ENCOUNTER — COMMUNICATION - HEALTHEAST (OUTPATIENT)
Dept: INTERNAL MEDICINE | Facility: CLINIC | Age: 59
End: 2019-12-10

## 2019-12-10 DIAGNOSIS — M19.079 OSTEOARTHRITIS OF ANKLE OR FOOT, UNSPECIFIED LATERALITY: ICD-10-CM

## 2019-12-27 ENCOUNTER — COMMUNICATION - HEALTHEAST (OUTPATIENT)
Dept: INTERNAL MEDICINE | Facility: CLINIC | Age: 59
End: 2019-12-27

## 2019-12-27 DIAGNOSIS — E78.5 HYPERLIPIDEMIA: ICD-10-CM

## 2020-01-01 ENCOUNTER — COMMUNICATION - HEALTHEAST (OUTPATIENT)
Dept: INTERNAL MEDICINE | Facility: CLINIC | Age: 60
End: 2020-01-01

## 2020-01-01 DIAGNOSIS — R52 PAIN: ICD-10-CM

## 2020-03-02 ENCOUNTER — HEALTH MAINTENANCE LETTER (OUTPATIENT)
Age: 60
End: 2020-03-02

## 2020-03-04 ENCOUNTER — COMMUNICATION - HEALTHEAST (OUTPATIENT)
Dept: INTERNAL MEDICINE | Facility: CLINIC | Age: 60
End: 2020-03-04

## 2020-03-04 DIAGNOSIS — M19.079 OSTEOARTHRITIS OF ANKLE OR FOOT, UNSPECIFIED LATERALITY: ICD-10-CM

## 2020-03-29 ENCOUNTER — COMMUNICATION - HEALTHEAST (OUTPATIENT)
Dept: INTERNAL MEDICINE | Facility: CLINIC | Age: 60
End: 2020-03-29

## 2020-03-29 DIAGNOSIS — R52 PAIN: ICD-10-CM

## 2020-06-10 ENCOUNTER — COMMUNICATION - HEALTHEAST (OUTPATIENT)
Dept: INTERNAL MEDICINE | Facility: CLINIC | Age: 60
End: 2020-06-10

## 2020-06-10 DIAGNOSIS — M19.079 OSTEOARTHRITIS OF ANKLE OR FOOT, UNSPECIFIED LATERALITY: ICD-10-CM

## 2020-06-11 ENCOUNTER — COMMUNICATION - HEALTHEAST (OUTPATIENT)
Dept: INTERNAL MEDICINE | Facility: CLINIC | Age: 60
End: 2020-06-11

## 2020-06-11 DIAGNOSIS — M19.079 OSTEOARTHRITIS OF ANKLE OR FOOT, UNSPECIFIED LATERALITY: ICD-10-CM

## 2020-06-15 ENCOUNTER — COMMUNICATION - HEALTHEAST (OUTPATIENT)
Dept: INTERNAL MEDICINE | Facility: CLINIC | Age: 60
End: 2020-06-15

## 2020-06-15 DIAGNOSIS — R52 PAIN: ICD-10-CM

## 2020-06-15 DIAGNOSIS — G60.9 HEREDITARY AND IDIOPATHIC PERIPHERAL NEUROPATHY: ICD-10-CM

## 2020-06-25 ENCOUNTER — RECORDS - HEALTHEAST (OUTPATIENT)
Dept: ADMINISTRATIVE | Facility: OTHER | Age: 60
End: 2020-06-25

## 2020-08-10 ENCOUNTER — COMMUNICATION - HEALTHEAST (OUTPATIENT)
Dept: INTERNAL MEDICINE | Facility: CLINIC | Age: 60
End: 2020-08-10

## 2020-08-10 DIAGNOSIS — I10 HTN (HYPERTENSION): ICD-10-CM

## 2020-09-05 ENCOUNTER — COMMUNICATION - HEALTHEAST (OUTPATIENT)
Dept: INTERNAL MEDICINE | Facility: CLINIC | Age: 60
End: 2020-09-05

## 2020-09-05 DIAGNOSIS — E03.9 HYPOTHYROID: ICD-10-CM

## 2020-09-06 ENCOUNTER — COMMUNICATION - HEALTHEAST (OUTPATIENT)
Dept: INTERNAL MEDICINE | Facility: CLINIC | Age: 60
End: 2020-09-06

## 2020-09-06 DIAGNOSIS — E03.9 HYPOTHYROID: ICD-10-CM

## 2020-09-07 ENCOUNTER — COMMUNICATION - HEALTHEAST (OUTPATIENT)
Dept: INTERNAL MEDICINE | Facility: CLINIC | Age: 60
End: 2020-09-07

## 2020-09-07 DIAGNOSIS — E03.9 HYPOTHYROID: ICD-10-CM

## 2020-09-08 ENCOUNTER — COMMUNICATION - HEALTHEAST (OUTPATIENT)
Dept: INTERNAL MEDICINE | Facility: CLINIC | Age: 60
End: 2020-09-08

## 2020-09-08 DIAGNOSIS — E03.9 HYPOTHYROID: ICD-10-CM

## 2020-09-10 ENCOUNTER — COMMUNICATION - HEALTHEAST (OUTPATIENT)
Dept: INTERNAL MEDICINE | Facility: CLINIC | Age: 60
End: 2020-09-10

## 2020-09-10 DIAGNOSIS — M54.9 MECHANICAL BACK PAIN: ICD-10-CM

## 2020-09-17 ENCOUNTER — COMMUNICATION - HEALTHEAST (OUTPATIENT)
Dept: INTERNAL MEDICINE | Facility: CLINIC | Age: 60
End: 2020-09-17

## 2020-09-17 DIAGNOSIS — R52 PAIN: ICD-10-CM

## 2020-10-07 ENCOUNTER — RECORDS - HEALTHEAST (OUTPATIENT)
Dept: ADMINISTRATIVE | Facility: OTHER | Age: 60
End: 2020-10-07

## 2020-10-08 ENCOUNTER — RECORDS - HEALTHEAST (OUTPATIENT)
Dept: LAB | Facility: HOSPITAL | Age: 60
End: 2020-10-08

## 2020-10-08 LAB
C REACTIVE PROTEIN LHE: 0.1 MG/DL (ref 0–0.8)
ERYTHROCYTE [SEDIMENTATION RATE] IN BLOOD BY WESTERGREN METHOD: 7 MM/HR (ref 0–15)

## 2020-10-13 ENCOUNTER — RECORDS - HEALTHEAST (OUTPATIENT)
Dept: ADMINISTRATIVE | Facility: OTHER | Age: 60
End: 2020-10-13

## 2020-11-03 ENCOUNTER — AMBULATORY - HEALTHEAST (OUTPATIENT)
Dept: SURGERY | Facility: CLINIC | Age: 60
End: 2020-11-03

## 2020-11-03 DIAGNOSIS — Z11.59 ENCOUNTER FOR SCREENING FOR OTHER VIRAL DISEASES: ICD-10-CM

## 2020-11-17 ENCOUNTER — OFFICE VISIT - HEALTHEAST (OUTPATIENT)
Dept: INTERNAL MEDICINE | Facility: CLINIC | Age: 60
End: 2020-11-17

## 2020-11-17 DIAGNOSIS — G89.29 CHRONIC RIGHT-SIDED LOW BACK PAIN WITH RIGHT-SIDED SCIATICA: ICD-10-CM

## 2020-11-17 DIAGNOSIS — E78.00 HYPERCHOLESTEROLEMIA: ICD-10-CM

## 2020-11-17 DIAGNOSIS — M54.41 CHRONIC RIGHT-SIDED LOW BACK PAIN WITH RIGHT-SIDED SCIATICA: ICD-10-CM

## 2020-11-17 DIAGNOSIS — I10 ESSENTIAL HYPERTENSION: ICD-10-CM

## 2020-11-17 DIAGNOSIS — Z01.818 PREOP GENERAL PHYSICAL EXAM: ICD-10-CM

## 2020-11-17 LAB
ATRIAL RATE - MUSE: 45 BPM
DIASTOLIC BLOOD PRESSURE - MUSE: NORMAL
HGB BLD-MCNC: 14.2 G/DL (ref 14–18)
INTERPRETATION ECG - MUSE: NORMAL
P AXIS - MUSE: 56 DEGREES
PR INTERVAL - MUSE: 164 MS
QRS DURATION - MUSE: 88 MS
QT - MUSE: 468 MS
QTC - MUSE: 404 MS
R AXIS - MUSE: 4 DEGREES
SYSTOLIC BLOOD PRESSURE - MUSE: NORMAL
T AXIS - MUSE: 28 DEGREES
VENTRICULAR RATE- MUSE: 45 BPM

## 2020-11-17 ASSESSMENT — MIFFLIN-ST. JEOR: SCORE: 1788.29

## 2020-11-23 ENCOUNTER — RECORDS - HEALTHEAST (OUTPATIENT)
Dept: ADMINISTRATIVE | Facility: OTHER | Age: 60
End: 2020-11-23

## 2020-11-25 ENCOUNTER — AMBULATORY - HEALTHEAST (OUTPATIENT)
Dept: SURGERY | Facility: CLINIC | Age: 60
End: 2020-11-25

## 2020-11-25 DIAGNOSIS — Z11.59 ENCOUNTER FOR SCREENING FOR OTHER VIRAL DISEASES: ICD-10-CM

## 2020-12-10 ENCOUNTER — COMMUNICATION - HEALTHEAST (OUTPATIENT)
Dept: INTERNAL MEDICINE | Facility: CLINIC | Age: 60
End: 2020-12-10

## 2020-12-10 DIAGNOSIS — E03.9 HYPOTHYROID: ICD-10-CM

## 2020-12-17 ENCOUNTER — AMBULATORY - HEALTHEAST (OUTPATIENT)
Dept: SURGERY | Facility: CLINIC | Age: 60
End: 2020-12-17

## 2020-12-17 DIAGNOSIS — Z11.59 ENCOUNTER FOR SCREENING FOR OTHER VIRAL DISEASES: ICD-10-CM

## 2020-12-20 ENCOUNTER — HEALTH MAINTENANCE LETTER (OUTPATIENT)
Age: 60
End: 2020-12-20

## 2021-01-15 ENCOUNTER — COMMUNICATION - HEALTHEAST (OUTPATIENT)
Dept: INTERNAL MEDICINE | Facility: CLINIC | Age: 61
End: 2021-01-15

## 2021-01-17 ENCOUNTER — AMBULATORY - HEALTHEAST (OUTPATIENT)
Dept: FAMILY MEDICINE | Facility: CLINIC | Age: 61
End: 2021-01-17

## 2021-01-17 DIAGNOSIS — Z11.59 ENCOUNTER FOR SCREENING FOR OTHER VIRAL DISEASES: ICD-10-CM

## 2021-01-18 ENCOUNTER — OFFICE VISIT - HEALTHEAST (OUTPATIENT)
Dept: INTERNAL MEDICINE | Facility: CLINIC | Age: 61
End: 2021-01-18

## 2021-01-18 ENCOUNTER — COMMUNICATION - HEALTHEAST (OUTPATIENT)
Dept: SCHEDULING | Facility: CLINIC | Age: 61
End: 2021-01-18

## 2021-01-18 ENCOUNTER — COMMUNICATION - HEALTHEAST (OUTPATIENT)
Dept: INTERNAL MEDICINE | Facility: CLINIC | Age: 61
End: 2021-01-18

## 2021-01-18 DIAGNOSIS — I10 ESSENTIAL HYPERTENSION: ICD-10-CM

## 2021-01-18 DIAGNOSIS — M15.0 PRIMARY OSTEOARTHRITIS INVOLVING MULTIPLE JOINTS: ICD-10-CM

## 2021-01-18 DIAGNOSIS — Z01.810 PREOPERATIVE CARDIOVASCULAR EXAMINATION: ICD-10-CM

## 2021-01-18 DIAGNOSIS — G60.9 PERIPHERAL NEUROPATHY, IDIOPATHIC: ICD-10-CM

## 2021-01-18 DIAGNOSIS — R73.03 PREDIABETES: ICD-10-CM

## 2021-01-18 DIAGNOSIS — E03.9 HYPOTHYROIDISM, UNSPECIFIED TYPE: ICD-10-CM

## 2021-01-18 DIAGNOSIS — T84.012A FAILED TOTAL RIGHT KNEE REPLACEMENT, INITIAL ENCOUNTER (H): ICD-10-CM

## 2021-01-18 DIAGNOSIS — E66.3 OVERWEIGHT: ICD-10-CM

## 2021-01-18 LAB
ANION GAP SERPL CALCULATED.3IONS-SCNC: 9 MMOL/L (ref 5–18)
BUN SERPL-MCNC: 25 MG/DL (ref 8–22)
CALCIUM SERPL-MCNC: 9.4 MG/DL (ref 8.5–10.5)
CHLORIDE BLD-SCNC: 106 MMOL/L (ref 98–107)
CO2 SERPL-SCNC: 26 MMOL/L (ref 22–31)
CREAT SERPL-MCNC: 0.95 MG/DL (ref 0.7–1.3)
GFR SERPL CREATININE-BSD FRML MDRD: >60 ML/MIN/1.73M2
GLUCOSE BLD-MCNC: 94 MG/DL (ref 70–125)
HBA1C MFR BLD: 5.5 %
HGB BLD-MCNC: 14.1 G/DL (ref 14–18)
POTASSIUM BLD-SCNC: 4.9 MMOL/L (ref 3.5–5)
SARS-COV-2 PCR COMMENT: NORMAL
SARS-COV-2 RNA SPEC QL NAA+PROBE: NEGATIVE
SARS-COV-2 VIRUS SPECIMEN SOURCE: NORMAL
SODIUM SERPL-SCNC: 141 MMOL/L (ref 136–145)

## 2021-01-18 ASSESSMENT — MIFFLIN-ST. JEOR: SCORE: 1820.04

## 2021-01-19 ENCOUNTER — COMMUNICATION - HEALTHEAST (OUTPATIENT)
Dept: INTERNAL MEDICINE | Facility: CLINIC | Age: 61
End: 2021-01-19

## 2021-01-20 ENCOUNTER — SURGERY - HEALTHEAST (OUTPATIENT)
Dept: SURGERY | Facility: CLINIC | Age: 61
End: 2021-01-20

## 2021-01-20 ENCOUNTER — ANESTHESIA - HEALTHEAST (OUTPATIENT)
Dept: SURGERY | Facility: CLINIC | Age: 61
End: 2021-01-20

## 2021-02-02 ENCOUNTER — RECORDS - HEALTHEAST (OUTPATIENT)
Dept: ADMINISTRATIVE | Facility: OTHER | Age: 61
End: 2021-02-02

## 2021-02-08 ENCOUNTER — COMMUNICATION - HEALTHEAST (OUTPATIENT)
Dept: INTERNAL MEDICINE | Facility: CLINIC | Age: 61
End: 2021-02-08

## 2021-02-08 DIAGNOSIS — R52 PAIN: ICD-10-CM

## 2021-03-01 ENCOUNTER — RECORDS - HEALTHEAST (OUTPATIENT)
Dept: ADMINISTRATIVE | Facility: OTHER | Age: 61
End: 2021-03-01

## 2021-04-18 ENCOUNTER — HEALTH MAINTENANCE LETTER (OUTPATIENT)
Age: 61
End: 2021-04-18

## 2021-05-25 ENCOUNTER — RECORDS - HEALTHEAST (OUTPATIENT)
Dept: ADMINISTRATIVE | Facility: CLINIC | Age: 61
End: 2021-05-25

## 2021-05-26 ENCOUNTER — RECORDS - HEALTHEAST (OUTPATIENT)
Dept: ADMINISTRATIVE | Facility: CLINIC | Age: 61
End: 2021-05-26

## 2021-05-26 NOTE — TELEPHONE ENCOUNTER
Refill Approved    Rx renewed per Medication Renewal Policy. Medication was last renewed on 2/27/19.    Chelita Sierra, [facility] Connection Triage/Med Refill 3/23/2019     Requested Prescriptions   Pending Prescriptions Disp Refills     losartan-hydrochlorothiazide (HYZAAR) 50-12.5 mg per tablet [Pharmacy Med Name: LOSARTAN/HCTZ 50/12.5MG TABLETS] 90 tablet 0     Sig: TAKE 1 TABLET BY MOUTH EVERY DAY    Diuretics/Combination Diuretics Refill Protocol  Passed - 3/22/2019  8:23 AM       Passed - Visit with PCP or prescribing provider visit in past 12 months    Last office visit with prescriber/PCP: 3/27/2018 Marquez Pearl MD OR same dept: 3/27/2018 Marquez Pearl MD OR same specialty: 3/27/2018 Marquez Pearl MD  Last physical: 4/23/2018 Last MTM visit: Visit date not found   Next visit within 3 mo: Visit date not found  Next physical within 3 mo: Visit date not found  Prescriber OR PCP: Marquez Pearl MD  Last diagnosis associated with med order: 1. HTN (hypertension)  - losartan-hydrochlorothiazide (HYZAAR) 50-12.5 mg per tablet [Pharmacy Med Name: LOSARTAN/HCTZ 50/12.5MG TABLETS]; TAKE 1 TABLET BY MOUTH EVERY DAY  Dispense: 90 tablet; Refill: 0    If protocol passes may refill for 12 months if within 3 months of last provider visit (or a total of 15 months).            Passed - Serum Potassium in past 12 months     Lab Results   Component Value Date    Potassium 4.7 04/23/2018            Passed - Serum Sodium in past 12 months     Lab Results   Component Value Date    Sodium 143 04/23/2018            Passed - Blood pressure on file in past 12 months    BP Readings from Last 1 Encounters:   04/23/18 112/70            Passed - Serum Creatinine in past 12 months     Creatinine   Date Value Ref Range Status   04/23/2018 0.97 0.70 - 1.30 mg/dL Final

## 2021-05-27 NOTE — TELEPHONE ENCOUNTER
Left message to call back for: Sheree  Information to relay to patient:  Form faxed back is Dr Pearl's signature.

## 2021-05-27 NOTE — TELEPHONE ENCOUNTER
Orders being requested: Patient needs a replacement CPAP machine [place].  Sheree from [facility] has sent over an order request on 3/8/19 and she needs the clarification order that was faxed over to be signed by PCP or any covering provider.  She will be sending another order over that doesn't say Dr. Pearl only because this is something that can be signed by any provider at the clinic.  Patient has been waiting and calling Sheree a few times a day to check on the status.    When are orders needed by: as soon as possible  Where to send Orders: Fax: [phone]  Okay to leave detailed message?  Yes, if there is any questions please call Sheree at [phone]

## 2021-05-27 NOTE — TELEPHONE ENCOUNTER
Called Sheree. She has faxed a new form that just needs a Dr signature. Form faxed back with Dr Giron's signature.

## 2021-05-27 NOTE — TELEPHONE ENCOUNTER
Who is calling:  Corey Bentley [facility] equipment  Reason for Call:  Sheree received clarification back with signature from Dr. Giron, however it was signed on the form that was already pended for Dr. Pearl. Form that was signed has Dr. Gonzalez information such as his NPI etc. Insurance will not accept this. Sheree sent over blank form to be refilled out by Dr. Giron with his information. She is asking if covering provider can fill out form with their own information, NPI, etc and sign and fax back. Patient has been waiting for 5 days for C-Pap so she would like to get this figured out today if possible. FAX: [phone]. Please advise and call with any questions, thank you.    Date of last appointment with primary care: 4/23/18  Okay to leave a detailed message: Yes

## 2021-05-28 NOTE — TELEPHONE ENCOUNTER
RN cannot approve Refill Request    RN can NOT refill this medication med is not covered by policy/route to provider. Last office visit: 3/27/2018 Marquez Pearl MD Last Physical: 4/23/2018 Last MTM visit: Visit date not found Last visit same specialty: 3/27/2018 Marquez Pearl MD.  Next visit within 3 mo: Visit date not found  Next physical within 3 mo: Visit date not found      Janine Villatoro, [facility] Triage/Med Refill 5/7/2019    Requested Prescriptions   Pending Prescriptions Disp Refills     sulindac (CLINORIL) 200 MG tablet [Pharmacy Med Name: SULINDAC 200MG TABLETS] 60 tablet 0     Sig: TAKE 1 TABLET BY MOUTH TWICE DAILY       There is no refill protocol information for this order

## 2021-05-28 NOTE — TELEPHONE ENCOUNTER
RN cannot approve Refill Request    RN can NOT refill this medication overdue for office visits and/or labs.    Jose Antonio Gupta, [facility] Triage/Med Refill 5/8/2019    Requested Prescriptions   Pending Prescriptions Disp Refills     losartan-hydrochlorothiazide (HYZAAR) 50-12.5 mg per tablet [Pharmacy Med Name: LOSARTAN/HCTZ 50/12.5MG TABLETS] 90 tablet 0     Sig: TAKE 1 TABLET BY MOUTH DAILY       Diuretics/Combination Diuretics Refill Protocol  Failed - 5/8/2019  5:25 AM        Failed - Visit with PCP or prescribing provider visit in past 12 months     Last office visit with prescriber/PCP: 3/27/2018 Marquez Pearl MD OR same dept: Visit date not found OR same specialty: 3/27/2018 Marquez Pearl MD  Last physical: 4/23/2018 Last MTM visit: Visit date not found   Next visit within 3 mo: Visit date not found  Next physical within 3 mo: Visit date not found  Prescriber OR PCP: Marquez Pearl MD  Last diagnosis associated with med order: 1. HTN (hypertension)  - losartan-hydrochlorothiazide (HYZAAR) 50-12.5 mg per tablet [Pharmacy Med Name: LOSARTAN/HCTZ 50/12.5MG TABLETS]; Take 1 tablet by mouth daily.  Dispense: 90 tablet; Refill: 0    If protocol passes may refill for 12 months if within 3 months of last provider visit (or a total of 15 months).             Failed - Serum Potassium in past 12 months      No results found for: LN-POTASSIUM          Failed - Serum Sodium in past 12 months      No results found for: LN-SODIUM          Failed - Blood pressure on file in past 12 months     BP Readings from Last 1 Encounters:   04/23/18 112/70             Failed - Serum Creatinine in past 12 months      Creatinine   Date Value Ref Range Status   04/23/2018 0.97 0.70 - 1.30 mg/dL Final

## 2021-05-28 NOTE — TELEPHONE ENCOUNTER
RN cannot approve Refill Request    RN can NOT refill this medication med is not covered by policy/route to provider.    Jose Antonio Gupta, [facility] Triage/Med Refill 5/2/2019    Requested Prescriptions   Pending Prescriptions Disp Refills     terbinafine HCl (LAMISIL) 250 mg tablet [Pharmacy Med Name: TERBINAFINE 250MG TABLETS] 60 tablet 0     Sig: TAKE 1 TABLET BY MOUTH DAILY       There is no refill protocol information for this order

## 2021-05-30 ENCOUNTER — RECORDS - HEALTHEAST (OUTPATIENT)
Dept: ADMINISTRATIVE | Facility: CLINIC | Age: 61
End: 2021-05-30

## 2021-05-30 NOTE — TELEPHONE ENCOUNTER
RN cannot approve Refill Request    RN can NOT refill this medication med is not covered by policy/route to provider. Last office visit: 3/27/2018 Marquez Pearl MD Last Physical: 4/23/2018 Last MTM visit: Visit date not found Last visit same specialty: 3/27/2018 Marquez Pearl MD.  Next visit within 3 mo: Visit date not found  Next physical within 3 mo: Visit date not found      Mary Galaviz, [facility] Triage/Med Refill 7/9/2019    Requested Prescriptions   Pending Prescriptions Disp Refills     terbinafine HCl (LAMISIL) 250 mg tablet [Pharmacy Med Name: TERBINAFINE 250MG TABLETS] 60 tablet 0     Sig: TAKE 1 TABLET BY MOUTH DAILY       There is no refill protocol information for this order

## 2021-05-31 ENCOUNTER — RECORDS - HEALTHEAST (OUTPATIENT)
Dept: ADMINISTRATIVE | Facility: CLINIC | Age: 61
End: 2021-05-31

## 2021-05-31 NOTE — TELEPHONE ENCOUNTER
RN cannot approve Refill Request    RN can NOT refill this medication Protocol failed and NO refill given.       Mary Chiu, [facility] Triage/Med Refill 8/1/2019    Requested Prescriptions   Pending Prescriptions Disp Refills     levothyroxine (SYNTHROID, LEVOTHROID) 137 MCG tablet [Pharmacy Med Name: LEVOTHYROXINE 0.137MG (137MCG) TAB] 90 tablet 0     Sig: TAKE ONE TABLET DAILY       Thyroid Hormones Protocol Failed - 8/1/2019  2:47 PM        Failed - Provider visit in past 12 months or next 3 months     Last office visit with prescriber/PCP: 3/27/2018 Marquez Pearl MD OR same dept: Visit date not found OR same specialty: 3/27/2018 Marquez Pearl MD  Last physical: 4/23/2018 Last MTM visit: Visit date not found   Next visit within 3 mo: Visit date not found  Next physical within 3 mo: Visit date not found  Prescriber OR PCP: Marquez Pearl MD  Last diagnosis associated with med order: 1. Peripheral Neuropathy  - levothyroxine (SYNTHROID, LEVOTHROID) 137 MCG tablet [Pharmacy Med Name: LEVOTHYROXINE 0.137MG (137MCG) TAB]; TAKE ONE TABLET DAILY  Dispense: 90 tablet; Refill: 0    2. Mechanical back pain  - terbinafine HCl (LAMISIL) 250 mg tablet [Pharmacy Med Name: TERBINAFINE 250MG TABLETS]; TAKE 1 TABLET BY MOUTH DAILY  Dispense: 60 tablet; Refill: 0    3. HTN (hypertension)  - losartan-hydrochlorothiazide (HYZAAR) 50-12.5 mg per tablet [Pharmacy Med Name: LOSARTAN/HCTZ 50/12.5MG TABLETS]; TAKE 1 TABLET BY MOUTH DAILY  Dispense: 90 tablet; Refill: 0    If protocol passes may refill for 12 months if within 3 months of last provider visit (or a total of 15 months).             Failed - TSH on file in past 12 months for patient age 12 & older     TSH   Date Value Ref Range Status   07/27/2016 1.59 0.30 - 5.00 uIU/mL Final                   terbinafine HCl (LAMISIL) 250 mg tablet [Pharmacy Med Name: TERBINAFINE 250MG TABLETS] 60 tablet 0     Sig: TAKE 1 TABLET BY MOUTH DAILY       There is no refill  protocol information for this order        losartan-hydrochlorothiazide (HYZAAR) 50-12.5 mg per tablet [Pharmacy Med Name: LOSARTAN/HCTZ 50/12.5MG TABLETS] 90 tablet 0     Sig: TAKE 1 TABLET BY MOUTH DAILY       Diuretics/Combination Diuretics Refill Protocol  Failed - 8/1/2019  2:47 PM        Failed - Visit with PCP or prescribing provider visit in past 12 months     Last office visit with prescriber/PCP: 3/27/2018 Marquez Pearl MD OR same dept: Visit date not found OR same specialty: 3/27/2018 Marquez Pearl MD  Last physical: 4/23/2018 Last MTM visit: Visit date not found   Next visit within 3 mo: Visit date not found  Next physical within 3 mo: Visit date not found  Prescriber OR PCP: Marquez Pearl MD  Last diagnosis associated with med order: 1. Peripheral Neuropathy  - levothyroxine (SYNTHROID, LEVOTHROID) 137 MCG tablet [Pharmacy Med Name: LEVOTHYROXINE 0.137MG (137MCG) TAB]; TAKE ONE TABLET DAILY  Dispense: 90 tablet; Refill: 0    2. Mechanical back pain  - terbinafine HCl (LAMISIL) 250 mg tablet [Pharmacy Med Name: TERBINAFINE 250MG TABLETS]; TAKE 1 TABLET BY MOUTH DAILY  Dispense: 60 tablet; Refill: 0    3. HTN (hypertension)  - losartan-hydrochlorothiazide (HYZAAR) 50-12.5 mg per tablet [Pharmacy Med Name: LOSARTAN/HCTZ 50/12.5MG TABLETS]; TAKE 1 TABLET BY MOUTH DAILY  Dispense: 90 tablet; Refill: 0    If protocol passes may refill for 12 months if within 3 months of last provider visit (or a total of 15 months).             Failed - Serum Potassium in past 12 months      No results found for: LN-POTASSIUM          Failed - Serum Sodium in past 12 months      No results found for: LN-SODIUM          Failed - Blood pressure on file in past 12 months     BP Readings from Last 1 Encounters:   04/23/18 112/70             Failed - Serum Creatinine in past 12 months      Creatinine   Date Value Ref Range Status   04/23/2018 0.97 0.70 - 1.30 mg/dL Final

## 2021-06-01 VITALS — WEIGHT: 232 LBS | BODY MASS INDEX: 30.75 KG/M2 | HEIGHT: 73 IN

## 2021-06-01 VITALS — HEIGHT: 73 IN | WEIGHT: 236 LBS | BODY MASS INDEX: 31.28 KG/M2

## 2021-06-01 NOTE — TELEPHONE ENCOUNTER
RN cannot approve Refill Request    RN can NOT refill this medication PCP messaged that patient is overdue for Office Visit. Last office visit: 3/27/2018 Marquez Pearl MD Last Physical: 4/23/2018 Last MTM visit: Visit date not found Last visit same specialty: 3/27/2018 Marquez Pearl MD.  Next visit within 3 mo: Visit date not found  Next physical within 3 mo: Visit date not found      Lanny LACY Glenda, [facility] Triage/Med Refill 9/27/2019    Requested Prescriptions   Pending Prescriptions Disp Refills     simvastatin (ZOCOR) 80 MG tablet [Pharmacy Med Name: SIMVASTATIN 80MG TABLETS] 45 tablet 0     Sig: TAKE 1/2 TABLET BY MOUTH DAILY       Statins Refill Protocol (Hmg CoA Reductase Inhibitors) Failed - 9/26/2019 12:09 PM        Failed - PCP or prescribing provider visit in past 12 months      Last office visit with prescriber/PCP: 3/27/2018 Marquez Pearl MD OR same dept: Visit date not found OR same specialty: 3/27/2018 Marquez Pearl MD  Last physical: 4/23/2018 Last MTM visit: Visit date not found   Next visit within 3 mo: Visit date not found  Next physical within 3 mo: Visit date not found  Prescriber OR PCP: Marquez Pearl MD  Last diagnosis associated with med order: 1. Hyperlipidemia  - simvastatin (ZOCOR) 80 MG tablet [Pharmacy Med Name: SIMVASTATIN 80MG TABLETS]; TAKE 1/2 TABLET BY MOUTH DAILY  Dispense: 45 tablet; Refill: 0    If protocol passes may refill for 12 months if within 3 months of last provider visit (or a total of 15 months).

## 2021-06-01 NOTE — TELEPHONE ENCOUNTER
RN cannot approve Refill Request    RN can NOT refill this medication med is not covered by policy/route to provider. Last office visit: 3/27/2018 Marquez Pearl MD Last Physical: 4/23/2018 Last MTM visit: Visit date not found Last visit same specialty: 3/27/2018 Marquez Pearl MD.  Next visit within 3 mo: Visit date not found  Next physical within 3 mo: Visit date not found      Carol Ann Diego, [facility] Triage/Med Refill 9/8/2019    Requested Prescriptions   Pending Prescriptions Disp Refills     sulindac (CLINORIL) 200 MG tablet [Pharmacy Med Name: SULINDAC 200MG TABLETS] 180 tablet 0     Sig: TAKE 1 TABLET BY MOUTH TWICE DAILY       There is no refill protocol information for this order

## 2021-06-02 ENCOUNTER — RECORDS - HEALTHEAST (OUTPATIENT)
Dept: ADMINISTRATIVE | Facility: CLINIC | Age: 61
End: 2021-06-02

## 2021-06-04 NOTE — TELEPHONE ENCOUNTER
RN cannot approve Refill Request    RN can NOT refill this medication med is not covered by policy/route to provider. Last office visit: 3/27/2018 Marquez Pearl MD Last Physical: 4/23/2018 Last MTM visit: Visit date not found Last visit same specialty: 3/27/2018 Marquez Pearl MD.  Next visit within 3 mo: Visit date not found  Next physical within 3 mo: Visit date not found      Chelita Sierra, [facility] Triage/Med Refill 12/10/2019    Requested Prescriptions   Pending Prescriptions Disp Refills     sulindac (CLINORIL) 200 MG tablet [Pharmacy Med Name: SULINDAC 200MG TABLETS] 180 tablet 0     Sig: TAKE 1 TABLET BY MOUTH TWICE DAILY       There is no refill protocol information for this order

## 2021-06-04 NOTE — TELEPHONE ENCOUNTER
RN cannot approve Refill Request    RN can NOT refill this medication Protocol failed and NO refill given.         Mary Chiu, [facility] Triage/Med Refill 12/29/2019    Requested Prescriptions   Pending Prescriptions Disp Refills     simvastatin (ZOCOR) 80 MG tablet [Pharmacy Med Name: SIMVASTATIN 80MG TABLETS] 45 tablet 0     Sig: TAKE 1/2 TABLET BY MOUTH DAILY       Statins Refill Protocol (Hmg CoA Reductase Inhibitors) Failed - 12/27/2019 12:19 PM        Failed - PCP or prescribing provider visit in past 12 months      Last office visit with prescriber/PCP: Visit date not found OR same dept: Visit date not found OR same specialty: 3/27/2018 Marquez Pearl MD  Last physical: Visit date not found Last MTM visit: Visit date not found   Next visit within 3 mo: Visit date not found  Next physical within 3 mo: Visit date not found  Prescriber OR PCP: Francis Giron MD  Last diagnosis associated with med order: 1. Hyperlipidemia  - simvastatin (ZOCOR) 80 MG tablet [Pharmacy Med Name: SIMVASTATIN 80MG TABLETS]; TAKE 1/2 TABLET BY MOUTH DAILY  Dispense: 45 tablet; Refill: 0    If protocol passes may refill for 12 months if within 3 months of last provider visit (or a total of 15 months).

## 2021-06-05 VITALS
WEIGHT: 213 LBS | DIASTOLIC BLOOD PRESSURE: 58 MMHG | SYSTOLIC BLOOD PRESSURE: 110 MMHG | OXYGEN SATURATION: 96 % | HEIGHT: 73 IN | HEART RATE: 82 BPM | BODY MASS INDEX: 28.23 KG/M2

## 2021-06-05 VITALS
SYSTOLIC BLOOD PRESSURE: 102 MMHG | TEMPERATURE: 97.6 F | DIASTOLIC BLOOD PRESSURE: 62 MMHG | WEIGHT: 206 LBS | OXYGEN SATURATION: 98 % | HEART RATE: 55 BPM | HEIGHT: 73 IN | BODY MASS INDEX: 27.3 KG/M2

## 2021-06-05 VITALS — BODY MASS INDEX: 27.31 KG/M2 | WEIGHT: 207 LBS

## 2021-06-06 NOTE — TELEPHONE ENCOUNTER
Former patient of Dr LEXY Pearl & has not established care with another provider.  Please assign refill request to covering provider per Clinic standard process.    RN cannot approve Refill Request    RN can NOT refill this medication med is not covered by policy/route to provider. Last office visit: 3/27/2018 Marquez Pearl MD Last Physical: 4/23/2018 Last MTM visit: Visit date not found Last visit same specialty: 3/27/2018 Marquez Pearl MD.  Next visit within 3 mo: Visit date not found  Next physical within 3 mo: Visit date not found      Janine Villatoro, [facility] Triage/Med Refill 3/4/2020    Requested Prescriptions   Pending Prescriptions Disp Refills     sulindac (CLINORIL) 200 MG tablet [Pharmacy Med Name: SULINDAC 200MG TABLETS] 180 tablet 0     Sig: TAKE 1 TABLET BY MOUTH TWICE DAILY       There is no refill protocol information for this order

## 2021-06-07 NOTE — TELEPHONE ENCOUNTER
Prescription Monitoring Program activity reviewed with no discrepancies noted.      Last fill per : 1/3/2020  Quantity/days supply: 270    Controlled Substance Agreement on file: No  Date:     Last office visit with provider:  4/23/2018    Please advise.

## 2021-06-07 NOTE — TELEPHONE ENCOUNTER
Former patient of [facility] & has not established care with another provider.  Please assign refill request to covering provider per Clinic standard process.  Controlled Substance Refill Request  Medication Name:   Requested Prescriptions     Pending Prescriptions Disp Refills     pregabalin (LYRICA) 100 MG capsule [Pharmacy Med Name: PREGABALIN 100MG CAPSULES] 270 capsule 0     Sig: TAKE 1 CAPSULE BY MOUTH THREE TIMES DAILY     Date Last Fill: 1/2/20  Requested Pharmacy: [facility]  Submit electronically to pharmacy  Controlled Substance Agreement on file:   Encounter-Level CSA Scan Date:    There are no encounter-level csa scan date.        Last office visit:  4/23/18

## 2021-06-08 NOTE — TELEPHONE ENCOUNTER
[facility] & has not established care with another provider.  Please assign refill request to covering provider per Clinic standard process.  RN cannot approve Refill Request    RN can NOT refill this medication med is not covered by policy/route to provider     . Last office visit: Visit date not found Last Physical: Visit date not found Last MTM visit: Visit date not found Last visit same specialty: 3/27/2018 Marquez Pearl MD.  Next visit within 3 mo: Visit date not found  Next physical within 3 mo: Visit date not found      Mary Chiu, [facility] Connection Triage/Med Refill 6/12/2020    Requested Prescriptions   Pending Prescriptions Disp Refills     sulindac (CLINORIL) 200 MG tablet [Pharmacy Med Name: SULINDAC 200MG TABLETS] 180 tablet 0     Sig: TAKE ONE TABLET BY MOUTH TWICE DAILY       There is no refill protocol information for this order

## 2021-06-08 NOTE — TELEPHONE ENCOUNTER
RN cannot approve Refill Request    RN can NOT refill this medication Protocol failed and NO refill given.      Mary Chiu, [facility] Connection Triage/Med Refill 6/16/2020    Requested Prescriptions   Pending Prescriptions Disp Refills     levothyroxine (SYNTHROID, LEVOTHROID) 137 MCG tablet [Pharmacy Med Name: LEVOTHYROXINE 0.137MG (137MCG) TAB] 90 tablet 3     Sig: TAKE ONE TABLET DAILY       Thyroid Hormones Protocol Failed - 6/15/2020  9:40 AM        Failed - Provider visit in past 12 months or next 3 months     Last office visit with prescriber/PCP: 3/27/2018 Marquez Pearl MD OR same dept: Visit date not found OR same specialty: 3/27/2018 Marquez Pearl MD  Last physical: 4/23/2018 Last MTM visit: Visit date not found   Next visit within 3 mo: Visit date not found  Next physical within 3 mo: Visit date not found  Prescriber OR PCP: Marquez Pearl MD  Last diagnosis associated with med order: 1. Peripheral Neuropathy  - levothyroxine (SYNTHROID, LEVOTHROID) 137 MCG tablet [Pharmacy Med Name: LEVOTHYROXINE 0.137MG (137MCG) TAB]; TAKE ONE TABLET DAILY  Dispense: 90 tablet; Refill: 3    If protocol passes may refill for 12 months if within 3 months of last provider visit (or a total of 15 months).             Failed - TSH on file in past 12 months for patient age 12 & older     TSH   Date Value Ref Range Status   07/27/2016 1.59 0.30 - 5.00 uIU/mL Final

## 2021-06-11 NOTE — TELEPHONE ENCOUNTER
RN cannot approve Refill Request    RN can NOT refill this medication No established PCP. Last office visit: 3/27/2018 Marquez Pearl MD Last Physical: 4/23/2018 Last MTM visit: Visit date not found Last visit same specialty: 3/27/2018 Marquez Pearl MD.  Next visit within 3 mo: Visit date not found  Next physical within 3 mo: Visit date not found      Pauline Solis, [facility] Connection Triage/Med Refill 9/5/2020    Requested Prescriptions   Pending Prescriptions Disp Refills     levothyroxine (SYNTHROID, LEVOTHROID) 137 MCG tablet [Pharmacy Med Name: LEVOTHYROXINE 0.137MG (137MCG) TAB] 4 tablet 0     Sig: TAKE 1 TABLET BY MOUTH EVERY DAY AT 6:00AM       Thyroid Hormones Protocol Failed - 9/5/2020  4:00 AM        Failed - Provider visit in past 12 months or next 3 months     Last office visit with prescriber/PCP: 3/27/2018 Marquez Pearl MD OR same dept: Visit date not found OR same specialty: 3/27/2018 Marquez Pearl MD  Last physical: 4/23/2018 Last MTM visit: Visit date not found   Next visit within 3 mo: Visit date not found  Next physical within 3 mo: Visit date not found  Prescriber OR PCP: Marquez Pearl MD  Last diagnosis associated with med order: 1. Hypothyroid  - levothyroxine (SYNTHROID, LEVOTHROID) 137 MCG tablet [Pharmacy Med Name: LEVOTHYROXINE 0.137MG (137MCG) TAB]; TAKE 1 TABLET BY MOUTH EVERY DAY AT 6:00AM  Dispense: 4 tablet; Refill: 0    If protocol passes may refill for 12 months if within 3 months of last provider visit (or a total of 15 months).             Failed - TSH on file in past 12 months for patient age 12 & older     TSH   Date Value Ref Range Status   07/27/2016 1.59 0.30 - 5.00 uIU/mL Final

## 2021-06-11 NOTE — TELEPHONE ENCOUNTER
Patient not seen since 2018.    Needs appt & labs before medication refills.    Vale ESCOBAR LPN .......... 9:41 AM  09/16/20  [facility]

## 2021-06-11 NOTE — TELEPHONE ENCOUNTER
9/9 10:11 AM Spoke to patient.  Declined offer to schedule an Establish Care appointment.  Patient has not established care at another clinic.  Advised patient that he can request an appointment via [facility] [employer] or call the Call Center 24/7.

## 2021-06-11 NOTE — TELEPHONE ENCOUNTER
RN cannot approve Refill Request    RN can NOT refill this medication Protocol failed and NO refill given. Last office visit: 3/27/2018 Marquez Pearl MD Last Physical: 4/23/2018 Last MTM visit: Visit date not found Last visit same specialty: 3/27/2018 Marquez Pearl MD.  Next visit within 3 mo: Visit date not found  Next physical within 3 mo: Visit date not found      Mary Chiu, [facility] Connection Triage/Med Refill 9/8/2020    Requested Prescriptions   Pending Prescriptions Disp Refills     levothyroxine (SYNTHROID, LEVOTHROID) 137 MCG tablet [Pharmacy Med Name: LEVOTHYROXINE 0.137MG (137MCG) TAB] 4 tablet 0     Sig: TAKE 1 TABLET BY MOUTH EVERY DAY AT 6:00AM       Thyroid Hormones Protocol Failed - 9/7/2020  4:02 AM        Failed - Provider visit in past 12 months or next 3 months     Last office visit with prescriber/PCP: 3/27/2018 Marquez Pearl MD OR same dept: Visit date not found OR same specialty: 3/27/2018 Marquez Pearl MD  Last physical: 4/23/2018 Last MTM visit: Visit date not found   Next visit within 3 mo: Visit date not found  Next physical within 3 mo: Visit date not found  Prescriber OR PCP: Marquez Pearl MD  Last diagnosis associated with med order: 1. Hypothyroid  - levothyroxine (SYNTHROID, LEVOTHROID) 137 MCG tablet [Pharmacy Med Name: LEVOTHYROXINE 0.137MG (137MCG) TAB]; TAKE 1 TABLET BY MOUTH EVERY DAY AT 6:00AM  Dispense: 4 tablet; Refill: 0    If protocol passes may refill for 12 months if within 3 months of last provider visit (or a total of 15 months).             Failed - TSH on file in past 12 months for patient age 12 & older     TSH   Date Value Ref Range Status   07/27/2016 1.59 0.30 - 5.00 uIU/mL Final

## 2021-06-11 NOTE — TELEPHONE ENCOUNTER
Patient needs to establish care with a new PCP.    Thank you.    Vale ESCOBAR LPN .......... 7:23 AM  09/08/20  [facility]

## 2021-06-11 NOTE — TELEPHONE ENCOUNTER
RN cannot approve Refill Request    RN can NOT refill this medication Protocol failed and NO refill given. Last office visit: 3/27/2018 Marquez Pearl MD Last Physical: 4/23/2018 Last MTM visit: Visit date not found Last visit same specialty: 3/27/2018 Marquez Pearl MD.  Next visit within 3 mo: Visit date not found  Next physical within 3 mo: Visit date not found      Mary Chiu, [facility] Connection Triage/Med Refill 9/9/2020    Requested Prescriptions   Pending Prescriptions Disp Refills     levothyroxine (SYNTHROID, LEVOTHROID) 137 MCG tablet [Pharmacy Med Name: LEVOTHYROXINE 0.137MG (137MCG) TAB] 4 tablet 0     Sig: TAKE 1 TABLET BY MOUTH EVERY DAY AT 6:00AM       Thyroid Hormones Protocol Failed - 9/8/2020  4:02 AM        Failed - Provider visit in past 12 months or next 3 months     Last office visit with prescriber/PCP: 3/27/2018 Marquez Pearl MD OR same dept: Visit date not found OR same specialty: 3/27/2018 Marquez Pearl MD  Last physical: 4/23/2018 Last MTM visit: Visit date not found   Next visit within 3 mo: Visit date not found  Next physical within 3 mo: Visit date not found  Prescriber OR PCP: Marquez Pearl MD  Last diagnosis associated with med order: 1. Hypothyroid  - levothyroxine (SYNTHROID, LEVOTHROID) 137 MCG tablet [Pharmacy Med Name: LEVOTHYROXINE 0.137MG (137MCG) TAB]; TAKE 1 TABLET BY MOUTH EVERY DAY AT 6:00AM  Dispense: 4 tablet; Refill: 0    If protocol passes may refill for 12 months if within 3 months of last provider visit (or a total of 15 months).             Failed - TSH on file in past 12 months for patient age 12 & older     TSH   Date Value Ref Range Status   07/27/2016 1.59 0.30 - 5.00 uIU/mL Final

## 2021-06-11 NOTE — TELEPHONE ENCOUNTER
RN cannot approve Refill Request    RN can NOT refill this medication No established PCP. Last office visit: 3/27/2018 Marquez Pearl MD Last Physical: 4/23/2018 Last MTM visit: Visit date not found Last visit same specialty: 3/27/2018 Marquez Pearl MD.  Next visit within 3 mo: Visit date not found  Next physical within 3 mo: Visit date not found      Pauline Solis, [facility] Connection Triage/Med Refill 9/7/2020    Requested Prescriptions   Pending Prescriptions Disp Refills     levothyroxine (SYNTHROID, LEVOTHROID) 137 MCG tablet [Pharmacy Med Name: LEVOTHYROXINE 0.137MG (137MCG) TAB] 4 tablet 0     Sig: TAKE 1 TABLET BY MOUTH EVERY DAY AT 6:00AM       Thyroid Hormones Protocol Failed - 9/6/2020  4:00 AM        Failed - Provider visit in past 12 months or next 3 months     Last office visit with prescriber/PCP: 3/27/2018 Marquez Pearl MD OR same dept: Visit date not found OR same specialty: 3/27/2018 Marquez Pearl MD  Last physical: 4/23/2018 Last MTM visit: Visit date not found   Next visit within 3 mo: Visit date not found  Next physical within 3 mo: Visit date not found  Prescriber OR PCP: Marquez Pearl MD  Last diagnosis associated with med order: 1. Hypothyroid  - levothyroxine (SYNTHROID, LEVOTHROID) 137 MCG tablet [Pharmacy Med Name: LEVOTHYROXINE 0.137MG (137MCG) TAB]; TAKE 1 TABLET BY MOUTH EVERY DAY AT 6:00AM  Dispense: 4 tablet; Refill: 0    If protocol passes may refill for 12 months if within 3 months of last provider visit (or a total of 15 months).             Failed - TSH on file in past 12 months for patient age 12 & older     TSH   Date Value Ref Range Status   07/27/2016 1.59 0.30 - 5.00 uIU/mL Final

## 2021-06-13 NOTE — PATIENT INSTRUCTIONS - HE
Do not take the sulindac on the day of the surgery  As you take the Hyzaar in the evening, it is okay to take it on the day of surgery.

## 2021-06-13 NOTE — PROGRESS NOTES
[facility]  [address], [facility] [address]  [facility] [place] [facility] [address]  Dept: [phone]  Dept Fax: [phone]  Primary Provider: Provider, No Primary Care  Pre-op Performing Provider: NICOLAS GLASS    PREOPERATIVE EVALUATION:  Today's date: 11/17/2020    Rja Nolan is a 59 y.o. male who presents for a preoperative evaluation.    Surgical Information:  Surgery/Procedure: RT knee replacement  Surgery Location: [facility]  Surgeon: Dr. Hart  Surgery Date: 12/16/2020  Time of Surgery: TBD  Where patient plans to recover: At home with family  Fax number for surgical facility: Note does not need to be faxed, will be available electronically in Epic.    Type of Anesthesia Anticipated: General    Subjective     HPI related to upcoming procedure: Mr Nolan will be undergoing a right total knee arthroplasty on 12/16/2020.  Has a history of chronic low back pain, hyperlipidemia, hypothyroidism, insomnia, GAVIN on CPAP, hypercholesterolemia, carpal tunnel syndrome, hypertension, testosterone deficiency.  Last seen in clinic in April 2018.  At that time, CMP, FLP, PSA and CBC unremarkable.  A1c was 5.4% in 5/2020 at [facility].  Is not on an anticoagulant. Is on levothyroxine, losartan-HCTZ, Lyrica, atorvastatin and sulindac.Was 229 lb on 2017. 10 year ASCVD event risk of 6.3%  Is now on atorvastatin 40 mg daily which was rx by Dr Will Palacios at [facility]. He does donate blood and denies hemoptysis, hematemesis and BRBPR. He will be bringing his CPAP with him. Will be spending the night in the hosptial and his wife will be taking him there.  Had recent labs done at [facility] from February and May 2020: vitamin D 45, hemoglobin 14.6 MCV 98.4, platelet 237, LDL 97 HDL 51  cholesterol 168, A1c 5.4%, TSH 2.1  Preop Questions 11/17/2020   Have you ever had a heart attack or stroke? No   Have you ever had surgery on your heart or blood vessels, such as a  stent placement, a coronary artery bypass, or surgery on an artery in your head, neck, heart, or legs? No   Do you have chest pain with activity? No   Do you have a history of  heart failure? No   Do you currently have a cold, bronchitis or symptoms of other infection? No   Do you have a cough, shortness of breath, or wheezing? No   Do you or anyone in your family have previous history of blood clots? Possibly mother had a blood clot - but unknown   Do you or does anyone in your family have a serious bleeding problem such as prolonged bleeding following surgeries or cuts? No   Have you ever had problems with anemia or been told to take iron pills? No   Have you had any abnormal blood loss such as black, tarry or bloody stools? No   Have you ever had a blood transfusion? No   Are you willing to have a blood transfusion if it is medically needed before, during, or after your surgery? Yes   Have you or any of your relatives ever had problems with anesthesia? No   Do you have sleep apnea, excessive snoring or daytime drowsiness? YES - uses CPAP   Do you have a CPAP machine? Yes   Do you have any artifical heart valves or other implanted medical devices like a pacemaker, defibrillator, or continuous glucose monitor? No   Do you have artificial joints? YES - Bilateral hips and knees   Are you allergic to latex? No     Health Care Directive:  Patient does not have a Health Care Directive or Living Will: Discussed advance care planning with patient; however, patient declined at this time.    Preoperative Review of :    reviewed - controlled substances reflected in medication list.     DIABETES - Patient has a longstanding history of DiabetesType Type II . Patient is being treated with none and denies significant side effects. Control has been good. Complicating factors include but are not limited to: hypertension and tobacco use.     HYPERTENSION - Patient has longstanding history of HTN , currently denies any  symptoms referable to elevated blood pressure. Specifically denies chest pain, palpitations, dyspnea, orthopnea, PND or peripheral edema. Blood pressure readings have been in normal range. Current medication regimen is as listed below. Patient denies any side effects of medication.     HYPOTHYROIDISM - Patient has a longstanding history of chronic Hypothyroidism. Patient has been doing well, noting no tremor, insomnia, hair loss or changes in skin texture. Continues to take medications as directed, without adverse reactions or side effects. Last TSH   Lab Results   Component Value Date    TSH 1.59 07/27/2016   .    SLEEPING DIFFICULTIES - patient uses a CPAP for GAVIN.     Review of Systems  Constitutional, neuro, ENT, endocrine, pulmonary, cardiac, gastrointestinal, genitourinary, musculoskeletal, integument and psychiatric systems are negative, except as otherwise noted.    Patient Active Problem List    Diagnosis Date Noted     Knee pain 03/15/2016     Sleep apnea      Peripheral neuropathy, idiopathic      Hypothyroidism      Hypertension      Hyperlipidemia      Insomnia 03/07/2016     Night sweats 03/07/2016     Preop testing 06/02/2015     Unspecified hypothyroidism 11/14/2014     Osteoarthrosis, unspecified whether generalized or localized, ankle and foot 11/14/2014     Osteoarthrosis, unspecified whether generalized or localized, unspecified site 11/14/2014     DJD (degenerative joint disease) of hip 07/22/2014     S/P total hip arthroplasty 07/22/2014     Fatigue      Decrease In Height      Testosterone deficiency      Hypercholesterolemia      Overweight      Carpal Tunnel Syndrome      Peripheral Neuropathy      Essential Hypertension      Past Medical History:   Diagnosis Date     Degenerative arthritis of hip     right     Fracture of lumbar spine (H)      Hyperlipidemia      Hypertension      Hypogonadism male      Hypothyroidism      Osteoarthritis      Peripheral neuropathy, idiopathic      Sleep  apnea     uses CPAP     Past Surgical History:   Procedure Laterality Date     ANKLE SURGERY Right 2013    reconstruction     CARPAL TUNNEL RELEASE Bilateral recent     CARPAL TUNNEL RELEASE Left around 2003     JOINT REPLACEMENT       KNEE ARTHROPLASTY Left      navicular repair Left 1975     TN REVISE KNEE JOINT REPLACE,ALL PARTS Left 3/15/2016    Procedure: #5  LEFT KNEE TOTAL ARTHROPLASTY REVISION;  Surgeon: Finesse Hester MD;  Location: [facility];  Service: Orthopedics     TN TOTAL HIP ARTHROPLASTY      Description: Total Hip Replacement;  Recorded: 08/04/2014;     QUADRICEPS REPAIR Right Dec. 2008     TOTAL HIP ARTHROPLASTY Right 7/22/2014    Procedure: RIGHT TOTAL HIP ARTHROPLASTY;  Surgeon: Finesse Hester MD;  Location: [facility];  Service:      Current Outpatient Medications   Medication Sig Dispense Refill     cholecalciferol, vitamin D3, 1,000 unit tablet Take 1,000 Units by mouth every evening.        coenzyme Q10 100 mg capsule Take 100 mg by mouth daily.       levothyroxine (SYNTHROID, LEVOTHROID) 137 MCG tablet TAKE 1 TABLET BY MOUTH EVERY DAY AT 6:00AM 4 tablet 0     losartan-hydrochlorothiazide (HYZAAR) 50-12.5 mg per tablet Take 1 tablet by mouth daily. 90 tablet 3     omeprazole (PRILOSEC) 40 MG capsule Take 40 mg by mouth daily before breakfast.       pregabalin (LYRICA) 100 MG capsule Take 1 capsule (100 mg total) by mouth 3 (three) times a day. 270 capsule 0     sulindac (CLINORIL) 200 MG tablet Take 1 tablet (200 mg total) by mouth 2 (two) times a day. 180 tablet 0     vit C/vit E/lutein/min/omega-3 (OCUVITE ORAL) Take by mouth daily.       No current facility-administered medications for this visit.      No Known Allergies    Social History     Tobacco Use     Smoking status: Light Tobacco Smoker     Smokeless tobacco: Never Used     Tobacco comment: cigar- seldom   Substance Use Topics     Alcohol use: Yes     Comment: average 2/day      No family history on  "file.  Social History     Substance and Sexual Activity   Drug Use No        Objective     /62 (Patient Site: Right Arm, Patient Position: Sitting, Cuff Size: Adult Regular)   Pulse (!) 55   Temp 97.6  F (36.4  C) (Tympanic)   Ht 6' 1\" (1.854 m)   Wt 206 lb (93.4 kg)   SpO2 98%   BMI 27.18 kg/m    Physical Exam    GENERAL APPEARANCE: healthy, alert and no distress    RESP: lungs clear to auscultation - no rales, rhonchi or wheezes    CV: regular rates and rhythm, normal S1 S2, no S3 or S4 and no murmur, click or rub    ABDOMEN:  soft, nontender, no HSM or masses and bowel sounds normal    SKIN: no suspicious lesions or rashes    NEURO: Normal strength and tone grossly, sensory exam grossly normal, mentation intact and speech normal     PSYCH: mentation appears normal. and affect normal/bright    Recent Labs   Lab Test 11/17/20  1211   HGB 14.2        PRE-OP Diagnostics:   Recent Results (from the past 24 hour(s))   Electrocardiogram Perform and Read    Collection Time: 11/17/20 11:06 AM   Result Value Ref Range    SYSTOLIC BLOOD PRESSURE      DIASTOLIC BLOOD PRESSURE      VENTRICULAR RATE 45 BPM    ATRIAL RATE 45 BPM    P-R INTERVAL 164 ms    QRS DURATION 88 ms    Q-T INTERVAL 468 ms    QTC CALCULATION (BEZET) 404 ms    P Axis 56 degrees    R AXIS 4 degrees    T AXIS 28 degrees    MUSE DIAGNOSIS       Sinus bradycardia  Otherwise normal ECG  When compared with ECG of 17-NOV-2016 15:23,  Vent. rate has decreased BY  30 BPM     Hemoglobin    Collection Time: 11/17/20 12:11 PM   Result Value Ref Range    Hemoglobin 14.2 14.0 - 18.0 g/dL     EKG: appears normal, NSR, Sinus bradycardia to 45: No ST/T wave abnormalities, QTc 404ms    REVISED CARDIAC RISK INDEX (RCRI)   The patient has the following serious cardiovascular risks for perioperative complications:   - No serious cardiac risks = 0 points    RCRI INTERPRETATION: 0 points: Class I (very low risk - 0.4% complication rate)     Assessment & Plan   The " proposed surgical procedure is considered INTERMEDIATE risk.    Preop general physical exam  Hypercholesterolemia  Essential hypertension  Chronic right-sided low back pain with right-sided sciatica  Possible Sleep Apnea  Patient will bring CPAP with him and in overall very good cardiovascular health.  Reviewed at length his 10-year ASCVD event risk of 6.3%.  Commended him on his weight loss and improvement in his A1c.  Commended him on his well-controlled blood pressure and likely decrease in blood pressure medicine if he continues to meet his goal weight of 190 pounds. Not on any anticoagulants but is on sulindac for chronic pain.  Counseled to please hold 7 days prior to surgery.  He takes the Hyzaar the night before surgery so that should be okay.  Please keep in light of his chronic low back pain with sciatica and polyneuropathy.  Continue levothyroxine in the postoperative setting.  - Electrocardiogram Perform and Read  - Hemoglobin    Provider consider completing risk assessment  Link to [employer] Flowsheet :[id number]}  No flowsheet data found.     Risks and Recommendations:  The patient has the following additional risks and recommendations for perioperative complications:  Obstructive Sleep Apnea:   Patient will bring CPAP with him    Medication Instructions:   - sulindac (Clinoril, Inez-Sundac [Cananda]): Initially counseled to HOLD on the day of surgery, however followed up with recommendation for him to HOLD 7 days before surgery.     RECOMMENDATION:  APPROVAL GIVEN to proceed with proposed procedure, without further diagnostic evaluation.    Signed Electronically by: Anthony Grullon MD    Copy of this evaluation report is provided to requesting physician.    Preop [facility] hospital Preop Guidelines    Revised Cardiac Risk Index

## 2021-06-13 NOTE — TELEPHONE ENCOUNTER
Attempted to call but was not able to leave voicemail. Will try back at a later time.    If patient returns call, please give him the message below. Please have him check with his surgeon regarding this question.    [employer] message also sent.  Narda Thomas CMA ............... 9:40 AM, 12/11/20

## 2021-06-13 NOTE — TELEPHONE ENCOUNTER
Who is calling:  Raj  Reason for Call:  Patient's surgery has been pushed out to January 13, 2021.  His pre-op was 11/17/20.  Does patient need to repeat pre-op?  Could he repeat lab only?  Would Dr. Grullon be able to update the 11/17/20 pre-op?  Date of last appointment with primary care: 11/17/20  Okay to leave a detailed message: Yes

## 2021-06-13 NOTE — TELEPHONE ENCOUNTER
Medication Question or Clarification  Who is calling: Raj  What medication are you calling about (include dose and sig)?: Levothyroxine 137 mcg, one tablet daily #90  Who prescribed the medication?: Dr. Grullon  What is your question/concern?: Patient had TSH level done on  5/7/20 at [facility].  Patient is asking for a 90 day supply of levothyroxine be sent to his pharmacy  [facility]  Okay to leave a detailed message?: Yes

## 2021-06-13 NOTE — TELEPHONE ENCOUNTER
I do not think he needs another preop however it is not my decision.  Nor do I think he needs a repeat lab.  I hope this helps

## 2021-06-14 NOTE — ANESTHESIA PROCEDURE NOTES
Spinal Block    Patient location during procedure: OR  Start time: 1/20/2021 2:35 PM  End time: 1/20/2021 2:41 PM  Reason for block: primary anesthetic    Staffing:  Performing  Anesthesiologist: Dagoberto Rosario MD    Preanesthetic Checklist  Completed: patient identified, risks, benefits, and alternatives discussed, timeout performed, consent obtained, airway assessed, oxygen available, suction available, emergency drugs available and hand hygiene performed  Spinal Block  Patient position: sitting  Prep: ChloraPrep  Patient monitoring: heart rate, cardiac monitor, continuous pulse ox and blood pressure  Approach: midline  Location: L3-4  Injection technique: single-shot  Needle type: pencil-tip   Needle gauge: 25 G      Additional Notes:  Moderate amount of L rotation and angulation of scoliosis, first pass with brief R buttock paresthesia; needle moved L with good LF release, no paresthesia, good CSF flow in all four quadrants

## 2021-06-14 NOTE — TELEPHONE ENCOUNTER
New Appointment Needed  What is the reason for the visit:    Pre-Op Appt Request  When is the surgery? : 1/20/2021  Where is the surgery?:   [facility]  Who is the surgeon? :  Dr. guzman  What type of surgery is being done?: Right knee surgery  Provider Preference: PCP only  How soon do you need to be seen?: As soon as possible  Okay to leave a detailed message:  Yes

## 2021-06-14 NOTE — ANESTHESIA POSTPROCEDURE EVALUATION
Patient: Raj Nolan  Procedure(s):  REVISION, TOTAL KNEE  ARTHROPLASTY (Right)  Anesthesia type: spinal    Patient location: PACU  Last vitals:   Vitals Value Taken Time   /70 01/20/21 1750   Temp 36.8  C (98.3  F) 01/20/21 1750   Pulse 68 01/20/21 1750   Resp 20 01/20/21 1750   SpO2 94 % 01/20/21 1750     Post vital signs: stable  Level of consciousness: awake and responds to simple questions  Post-anesthesia pain: pain controlled  Post-anesthesia nausea and vomiting: no  Pulmonary: unassisted, return to baseline  Cardiovascular: stable and blood pressure at baseline  Hydration: adequate  Anesthetic events: no    QCDR Measures:  ASA# 11 - Ana Maria-op Cardiac Arrest: ASA11B - Patient did NOT experience unanticipated cardiac arrest  ASA# 12 - Ana Maria-op Mortality Rate: ASA12B - Patient did NOT die  ASA# 13 - PACU Re-Intubation Rate: NA - No ETT / LMA used for case  ASA# 10 - Composite Anes Safety: ASA10A - No serious adverse event    Additional Notes: doing well, no complaints

## 2021-06-14 NOTE — ANESTHESIA PROCEDURE NOTES
Peripheral Block    Patient location during procedure: pre-op  Start time: 1/20/2021 1:59 PM  End time: 1/20/2021 2:03 PM  post-op analgesia per surgeon order as noted in medical record  Staffing:  Performing  Anesthesiologist: Xavier Gonzalez MD  Preanesthetic Checklist  Completed: patient identified, site marked, risks, benefits, and alternatives discussed, timeout performed, consent obtained, airway assessed, oxygen available, suction available, emergency drugs available and hand hygiene performed  Peripheral Block  Block type: saphenous, adductor canal block  Prep: ChloraPrep  Patient position: supine  Patient monitoring: cardiac monitor, continuous pulse oximetry, heart rate and blood pressure  Laterality: right  Injection technique: ultrasound guided    Ultrasound used to visualize needle placement in proximity to nerve being blocked: yes   US used to visualize anesthetic spread  Visualized anatomic structures normal  No Pathological Findings  Permanent ultrasound image captured for medical record  Sterile gel and probe cover used for ultrasound.  Needle  Needle type: echogenic   Needle gauge: 20G  Needle length: 4 in  no peripheral nerve catheter placed  Assessment  Injection assessment: no difficulty with injection, negative aspiration for heme, no paresthesia on injection and incremental injection           Term

## 2021-06-14 NOTE — PATIENT INSTRUCTIONS - HE
1. Bring CPAP to the hospital.  2. Do not take your losartan hydrochlorothiazide on the night before surgery.    Best wishes on your upcoming surgery.    ______________________________________________________________________     If you want to follow the current status of the coronavirus vaccine, I would recommend that you follow online at [email or web address]    The news related to [place] will likely be updated on the [place] Department of Health website - [email or web address].[email or web address]    Your county website also likely has information on other areas of vaccination in your county.

## 2021-06-14 NOTE — ANESTHESIA PREPROCEDURE EVALUATION
Anesthesia Evaluation      Patient summary reviewed   No history of anesthetic complications     Airway   Mallampati: I  Neck ROM: full   Pulmonary - normal exam    breath sounds clear to auscultation  (+) sleep apnea on CPAP, ,                          Cardiovascular - normal exam  Exercise tolerance: > or = 4 METS  (+) hypertension well controlled, , hypercholesterolemia,     Rhythm: regular  Rate: normal,         Neuro/Psych    (+) neuromuscular disease,      Endo/Other    (+) hypothyroidism, arthritis,      GI/Hepatic/Renal    (+) GERD well controlled,             Dental - normal exam                        Anesthesia Plan  Planned anesthetic: spinal and peripheral nerve block  Spinal + adductor canal block   ASA 2     Anesthetic plan and risks discussed with: patient  Anesthesia plan special considerations: antiemetics,   Post-op plan: routine recovery

## 2021-06-15 NOTE — TELEPHONE ENCOUNTER
reviewed and prescription signed.  Please let patient know that he needs to establish care with a new PCP and complete a CSA.  Thank you

## 2021-06-16 NOTE — TELEPHONE ENCOUNTER
Telephone Encounter by Jennie Medina LPN at 3/11/2019 11:09 AM     Author: Jennie Medina LPN Service: -- Author Type: Licensed Nurse    Filed: 3/11/2019 11:13 AM Encounter Date: 3/8/2019 Status: Signed    : Jennie Medina LPN (Licensed Nurse)       Marquez Pearl MD Thompson, Becky, [facility]; Marquez Pearl Care Team [place] 3 hours ago (7:31 AM)      Gabapentin does not come as a 150 mg capsule.  He could take 200 mg in the morning and 100 mill this may cause some drowsiness.  I would otherwise just continue to take the 100 mg tablet midday    Routing Comment

## 2021-06-16 NOTE — PROGRESS NOTES
"Office Visit - Follow up    Raj Nolan   57 y.o. male    Date of Visit: 3/27/2018    Chief Complaint   Patient presents with     Follow-up     Check up       Subjective: Raj is here today for concerns regarding back pain.  Has had intermittent problems with mid lumbar back pain in both sides along with radiculopathy and bilateral foot pain.  Previous evaluation by at least 2 separate spine surgeons have revealed multilevel degenerative changes both of disc and lumbar spine.  Potential surgical options have been reviewed.    He continues to have persistent symptoms of pain and does use Lyrica for chronic radicular pain.  Has used nonsteroidal agents only intermittently.  Has not been on an active flexion or low back exercise program.    Notes from his spine surgeon have been reviewed.  Potential surgery has been contemplated we did have a long discussion about this.    ROS: A comprehensive review of systems was performed and was otherwise negative except as mentioned above.     Exam  Brief exam of back is negative reflexes normal both lower extremities remainder of extremities unremarkable       /80  Pulse 66  Ht 6' 1\" (1.854 m)  Wt (!) 232 lb (105.2 kg)  BMI 30.61 kg/m2    Assessment and Plan  Lumbar disc and degenerative disease causing some symptoms of radiculopathy.  Also may have some neuropathic pain.  I have recommended evaluation and discussion with Dr. Dustin Sutherland and rehab program.  Also have recommended beginning sulindac regularly.  Discussed flexion exercise program.  He is due for physical exam he had questions regarding modest elevation of PSA and A1c.  Also has had sleep apnea and is in need of a new CPAP machine this will be reviewed I have asked him to schedule a physical in the near future    Raj was seen today for follow-up.    Diagnoses and all orders for this visit:    Mechanical back pain    Essential hypertension    Hypercholesterolemia    Other orders  -     sulindac " (CLINORIL) 200 MG tablet; Take 1 tablet (200 mg total) by mouth 2 (two) times a day.          Time: total time spent with the patient was 25 minutes of which >50% was spent in counseling and coordination of care        No Known Allergies    Medications :  Prior to Admission medications    Medication Sig Start Date End Date Taking? Authorizing Provider   aspirin 81 MG EC tablet Take 1 tablet (81 mg total) by mouth daily. RESUME AFTER FINISHING UP 325MG DOSE 3/17/16  Yes Yuridia Verdin MD   cholecalciferol, vitamin D3, 1,000 unit tablet Take 1,000 Units by mouth every evening.    Yes PROVIDER, HISTORICAL   coenzyme Q10 100 mg capsule Take 100 mg by mouth daily.   Yes PROVIDER, HISTORICAL   levothyroxine (SYNTHROID, LEVOTHROID) 137 MCG tablet TAKE ONE TABLET DAILY 12/1/17  Yes Marquez Pearl MD   losartan-hydrochlorothiazide (HYZAAR) 50-12.5 mg per tablet TAKE 1 TABLET BY MOUTH EVERY DAY. 3/10/18  Yes Marquez Pearl MD   magnesium oxide 500 mg Tab Take by mouth daily.   Yes PROVIDER, HISTORICAL   pregabalin (LYRICA) 100 MG capsule TAKE 1 CAPSULE BY MOUTH THREE TIMES DAILY 10/8/17  Yes Francis Giron MD   simvastatin (ZOCOR) 80 MG tablet TAKE 1/2 TABLET BY MOUTH DAILY 12/1/17  Yes Marquez Pearl MD   LYRICA 100 mg capsule TAKE 1 CAPSULE BY MOUTH THREE TIMES DAILY. 3/12/18 3/27/18 Yes Francis Giron MD   sulindac (CLINORIL) 200 MG tablet Take 1 tablet (200 mg total) by mouth 2 (two) times a day. 3/27/18   Marquez Pearl MD   levothyroxine (SYNTHROID, LEVOTHROID) 137 MCG tablet Take 1 tablet (137 mcg total) by mouth Daily at 6:00 am. 10/7/17 3/27/18  Marquez Pearl MD        Past Medical History:   Past Medical History:   Diagnosis Date     Degenerative arthritis of hip     right     Fracture of lumbar spine      Hyperlipidemia      Hypertension      Hypogonadism male      Hypothyroidism      Osteoarthritis      Peripheral neuropathy, idiopathic      Sleep apnea     uses CPAP       Past Surgical History:    Past Surgical History:   Procedure Laterality Date     ANKLE SURGERY Right 2013    reconstruction     CARPAL TUNNEL RELEASE Bilateral recent     CARPAL TUNNEL RELEASE Left around 2003     JOINT REPLACEMENT       KNEE ARTHROPLASTY Left      navicular repair Left 1975     MI REVISE KNEE JOINT REPLACE,ALL PARTS Left 3/15/2016    Procedure: #5  LEFT KNEE TOTAL ARTHROPLASTY REVISION;  Surgeon: Finesse Hester MD;  Location: [facility];  Service: Orthopedics     MI TOTAL HIP ARTHROPLASTY      Description: Total Hip Replacement;  Recorded: 08/04/2014;     QUADRICEPS REPAIR Right Dec. 2008     TOTAL HIP ARTHROPLASTY Right 7/22/2014    Procedure: RIGHT TOTAL HIP ARTHROPLASTY;  Surgeon: Finesse Hester MD;  Location: [facility];  Service:        Social History:   Social History     Social History     Marital status:      Spouse name: N/A     Number of children: N/A     Years of education: N/A     Occupational History     Not on file.     Social History Main Topics     Smoking status: Light Tobacco Smoker     Smokeless tobacco: Never Used      Comment: cigar- seldom     Alcohol use Yes      Comment: average 2/day     Drug use: No     Sexual activity: Not on file     Other Topics Concern     Not on file     Social History Narrative       Family History: No family history on file.      Marquez Pearl MD

## 2021-06-17 NOTE — PROGRESS NOTES
Office Visit - Physical    Raj Nolan   57 y.o. male    Date of Visit: 4/23/2018    Chief Complaint   Patient presents with     Annual Exam     Pt is fasting     Office Visit - Follow up    Raj Nolan   57 y.o. male    Date of Visit: 4/23/2018    Chief Complaint   Patient presents with     Annual Exam     Pt is fasting       Subjective: Patient is here for a regular physical.  Continues to have problems with chronic back pain and we had another long discussion.  He does have bilateral symptoms suggestive of radiculopathy as well as bilateral foot pain.  I have suggested continued rehab services and evaluation with Dr. Dustin Sutherland.  Would also continue with ongoing discussion with his spine surgeon.  I would continue with a combination of sulindac and pregabalin    Intermittent problems with ear congestion we did talk about seasonal rhinitis and use of decongestants.  See below.    Does have onychomycosis and we discussed the pros and cons of beginning systemic therapy we will begin a brief course of terbinafine lasting 6 months he will let me know how he has responded    Intermittent problems with muscular cramps we had a long discussion about this and I discussed the use of quinine in addition to magnesium oxide.    Blood pressures have been well controlled.    Has had previous hip arthroplasty as well as multiple foot procedures has also had surgery for carpal tunnel syndrome    History includes hypogonadism we are rechecking a testosterone level remainder of labs are pending.  He is concerned about possibility of diabetes although has not had significant sugar elevation we are checking an A1c as well    Is concerned about lack of hearing and I have suggested audiology testing    Personal social and family history again reviewed no new changes        ROS: A comprehensive review of systems was performed and was otherwise negative except as mentioned above.     Exam  Skin and lymphatics grossly  "negative he does have involvement in 3 or 4 of his toes with what appears to be modest to moderate onychomycosis    Head and neck negative EENT unremarkable ears clear heart normal lungs clear abdomen soft extremities no edema pulses normal rectal negative   /70  Pulse 72  Ht 6' 1\" (1.854 m)  Wt (!) 236 lb (107 kg)  BMI 31.14 kg/m2    Assessment and Plan  Will review labs when available no changes we are checking testosterone level PSA and routine labs    I will not make a change in his current regimen but did offer alternative nonsteroidal agents if he feels he would like to begin this.  Follow-up after evaluation by Dr. Dustin Sutherland and initial rehabilitative services    Raj was seen today for annual exam.    Diagnoses and all orders for this visit:    Peripheral Neuropathy  -     HM2(CBC w/o Differential); Future  -     Comprehensive Metabolic Panel; Future  -     HM2(CBC w/o Differential)  -     Comprehensive Metabolic Panel    Routine general medical examination at a health care facility  -     HM2(CBC w/o Differential); Future  -     Comprehensive Metabolic Panel; Future  -     PSA (Prostatic-Specific Antigen), Annual Screen  -     HM2(CBC w/o Differential)  -     Comprehensive Metabolic Panel    Onychomycosis  -     HM2(CBC w/o Differential); Future  -     Comprehensive Metabolic Panel; Future  -     HM2(CBC w/o Differential)  -     Comprehensive Metabolic Panel    Hyperlipidemia  -     HM2(CBC w/o Differential); Future  -     Comprehensive Metabolic Panel; Future  -     HM2(CBC w/o Differential)  -     Comprehensive Metabolic Panel    Other specified hypothyroidism  -     HM2(CBC w/o Differential); Future  -     Comprehensive Metabolic Panel; Future  -     HM2(CBC w/o Differential)  -     Comprehensive Metabolic Panel    Osteoarthritis of ankle or foot  -     HM2(CBC w/o Differential); Future  -     Comprehensive Metabolic Panel; Future  -     HM2(CBC w/o Differential)  -     Comprehensive " Metabolic Panel    Insomnia  -     HM2(CBC w/o Differential); Future  -     Comprehensive Metabolic Panel; Future  -     HM2(CBC w/o Differential)  -     Comprehensive Metabolic Panel    Hypercholesterolemia  -     HM2(CBC w/o Differential); Future  -     Comprehensive Metabolic Panel; Future  -     Lipid Cascade; Future  -     HM2(CBC w/o Differential)  -     Comprehensive Metabolic Panel  -     Lipid Cascade    Carpal tunnel syndrome  -     HM2(CBC w/o Differential); Future  -     Comprehensive Metabolic Panel; Future  -     HM2(CBC w/o Differential)  -     Comprehensive Metabolic Panel    Essential hypertension  -     HM2(CBC w/o Differential); Future  -     Comprehensive Metabolic Panel; Future  -     HM2(CBC w/o Differential)  -     Comprehensive Metabolic Panel    Status post total replacement of hip, unspecified laterality  -     HM2(CBC w/o Differential); Future  -     Comprehensive Metabolic Panel; Future  -     HM2(CBC w/o Differential)  -     Comprehensive Metabolic Panel    Elevated hemoglobin A1c  -     HM2(CBC w/o Differential); Future  -     Comprehensive Metabolic Panel; Future  -     Glycosylated Hemoglobin A1c  -     HM2(CBC w/o Differential)  -     Comprehensive Metabolic Panel    Testosterone deficiency  -     HM2(CBC w/o Differential); Future  -     Comprehensive Metabolic Panel; Future  -     Testosterone, Total  -     HM2(CBC w/o Differential)  -     Comprehensive Metabolic Panel    Other orders  -     terbinafine HCl (LAMISIL) 250 mg tablet; Take 1 tablet (250 mg total) by mouth daily.          Time: total time spent with the patient was 45 minutes of which >50% was spent in counseling and coordination of care        No Known Allergies    Medications :  Prior to Admission medications    Medication Sig Start Date End Date Taking? Authorizing Provider   aspirin 81 MG EC tablet Take 1 tablet (81 mg total) by mouth daily. RESUME AFTER FINISHING UP 325MG DOSE 3/17/16  Yes Yuridia Verdin MD    cholecalciferol, vitamin D3, 1,000 unit tablet Take 1,000 Units by mouth every evening.    Yes PROVIDER, HISTORICAL   coenzyme Q10 100 mg capsule Take 100 mg by mouth daily.   Yes PROVIDER, HISTORICAL   levothyroxine (SYNTHROID, LEVOTHROID) 137 MCG tablet TAKE ONE TABLET DAILY 12/1/17  Yes Marquez Pearl MD   losartan-hydrochlorothiazide (HYZAAR) 50-12.5 mg per tablet TAKE 1 TABLET BY MOUTH EVERY DAY. 3/10/18  Yes Marquez Pearl MD   magnesium oxide 500 mg Tab Take by mouth daily.   Yes PROVIDER, HISTORICAL   pregabalin (LYRICA) 100 MG capsule TAKE 1 CAPSULE BY MOUTH THREE TIMES DAILY 10/8/17  Yes Francis Giron MD   simvastatin (ZOCOR) 80 MG tablet TAKE 1/2 TABLET BY MOUTH DAILY 12/1/17  Yes Marquez Pearl MD   sulindac (CLINORIL) 200 MG tablet Take 1 tablet (200 mg total) by mouth 2 (two) times a day. 3/27/18  Yes Marquez Pearl MD   vit C/vit E/lutein/min/omega-3 (OCUVITE ORAL) Take by mouth daily.   Yes PROVIDER, HISTORICAL   terbinafine HCl (LAMISIL) 250 mg tablet Take 1 tablet (250 mg total) by mouth daily. 4/23/18 6/4/18  Marquez Pearl MD        Past Medical History:   Past Medical History:   Diagnosis Date     Degenerative arthritis of hip     right     Fracture of lumbar spine      Hyperlipidemia      Hypertension      Hypogonadism male      Hypothyroidism      Osteoarthritis      Peripheral neuropathy, idiopathic      Sleep apnea     uses CPAP       Past Surgical History:   Past Surgical History:   Procedure Laterality Date     ANKLE SURGERY Right 2013    reconstruction     CARPAL TUNNEL RELEASE Bilateral recent     CARPAL TUNNEL RELEASE Left around 2003     JOINT REPLACEMENT       KNEE ARTHROPLASTY Left      navicular repair Left 1975     WI REVISE KNEE JOINT REPLACE,ALL PARTS Left 3/15/2016    Procedure: #5  LEFT KNEE TOTAL ARTHROPLASTY REVISION;  Surgeon: Finesse Hester MD;  Location: [facility];  Service: Orthopedics     WI TOTAL HIP ARTHROPLASTY      Description: Total Hip  "Replacement;  Recorded: 08/04/2014;     QUADRICEPS REPAIR Right Dec. 2008     TOTAL HIP ARTHROPLASTY Right 7/22/2014    Procedure: RIGHT TOTAL HIP ARTHROPLASTY;  Surgeon: Finesse Hester MD;  Location: [facility];  Service:        Social History:   Social History     Social History     Marital status:      Spouse name: N/A     Number of children: N/A     Years of education: N/A     Occupational History     Not on file.     Social History Main Topics     Smoking status: Light Tobacco Smoker     Smokeless tobacco: Never Used      Comment: cigar- seldom     Alcohol use Yes      Comment: average 2/day     Drug use: No     Sexual activity: Not on file     Other Topics Concern     Not on file     Social History Narrative       Family History: No family history on file.      Marquez Pearl MD    Subjective:     ROS: A comprehensive review of systems was performed and was otherwise negative except as mentioned above.    Exam     /70  Pulse 72  Ht 6' 1\" (1.854 m)  Wt (!) 236 lb (107 kg)  BMI 31.14 kg/m2    Assessment and Plan      Raj was seen today for annual exam.    Diagnoses and all orders for this visit:    Peripheral Neuropathy  -     HM2(CBC w/o Differential); Future  -     Comprehensive Metabolic Panel; Future  -     HM2(CBC w/o Differential)  -     Comprehensive Metabolic Panel    Routine general medical examination at a health care facility  -     HM2(CBC w/o Differential); Future  -     Comprehensive Metabolic Panel; Future  -     PSA (Prostatic-Specific Antigen), Annual Screen  -     HM2(CBC w/o Differential)  -     Comprehensive Metabolic Panel    Onychomycosis  -     HM2(CBC w/o Differential); Future  -     Comprehensive Metabolic Panel; Future  -     HM2(CBC w/o Differential)  -     Comprehensive Metabolic Panel    Hyperlipidemia  -     HM2(CBC w/o Differential); Future  -     Comprehensive Metabolic Panel; Future  -     HM2(CBC w/o Differential)  -     Comprehensive Metabolic " Panel    Other specified hypothyroidism  -     HM2(CBC w/o Differential); Future  -     Comprehensive Metabolic Panel; Future  -     HM2(CBC w/o Differential)  -     Comprehensive Metabolic Panel    Osteoarthritis of ankle or foot  -     HM2(CBC w/o Differential); Future  -     Comprehensive Metabolic Panel; Future  -     HM2(CBC w/o Differential)  -     Comprehensive Metabolic Panel    Insomnia  -     HM2(CBC w/o Differential); Future  -     Comprehensive Metabolic Panel; Future  -     HM2(CBC w/o Differential)  -     Comprehensive Metabolic Panel    Hypercholesterolemia  -     HM2(CBC w/o Differential); Future  -     Comprehensive Metabolic Panel; Future  -     Lipid Cascade; Future  -     HM2(CBC w/o Differential)  -     Comprehensive Metabolic Panel  -     Lipid Cascade    Carpal tunnel syndrome  -     HM2(CBC w/o Differential); Future  -     Comprehensive Metabolic Panel; Future  -     HM2(CBC w/o Differential)  -     Comprehensive Metabolic Panel    Essential hypertension  -     HM2(CBC w/o Differential); Future  -     Comprehensive Metabolic Panel; Future  -     HM2(CBC w/o Differential)  -     Comprehensive Metabolic Panel    Status post total replacement of hip, unspecified laterality  -     HM2(CBC w/o Differential); Future  -     Comprehensive Metabolic Panel; Future  -     HM2(CBC w/o Differential)  -     Comprehensive Metabolic Panel    Elevated hemoglobin A1c  -     HM2(CBC w/o Differential); Future  -     Comprehensive Metabolic Panel; Future  -     Glycosylated Hemoglobin A1c  -     HM2(CBC w/o Differential)  -     Comprehensive Metabolic Panel    Testosterone deficiency  -     HM2(CBC w/o Differential); Future  -     Comprehensive Metabolic Panel; Future  -     Testosterone, Total  -     HM2(CBC w/o Differential)  -     Comprehensive Metabolic Panel    Other orders  -     terbinafine HCl (LAMISIL) 250 mg tablet; Take 1 tablet (250 mg total) by mouth daily.              Medications:   Prior to  Admission medications    Medication Sig Start Date End Date Taking? Authorizing Provider   aspirin 81 MG EC tablet Take 1 tablet (81 mg total) by mouth daily. RESUME AFTER FINISHING UP 325MG DOSE 3/17/16  Yes Yuridia Verdin MD   cholecalciferol, vitamin D3, 1,000 unit tablet Take 1,000 Units by mouth every evening.    Yes PROVIDER, HISTORICAL   coenzyme Q10 100 mg capsule Take 100 mg by mouth daily.   Yes PROVIDER, HISTORICAL   levothyroxine (SYNTHROID, LEVOTHROID) 137 MCG tablet TAKE ONE TABLET DAILY 12/1/17  Yes Marquez Pearl MD   losartan-hydrochlorothiazide (HYZAAR) 50-12.5 mg per tablet TAKE 1 TABLET BY MOUTH EVERY DAY. 3/10/18  Yes Marquez Pearl MD   magnesium oxide 500 mg Tab Take by mouth daily.   Yes PROVIDER, HISTORICAL   pregabalin (LYRICA) 100 MG capsule TAKE 1 CAPSULE BY MOUTH THREE TIMES DAILY 10/8/17  Yes Francis Giron MD   simvastatin (ZOCOR) 80 MG tablet TAKE 1/2 TABLET BY MOUTH DAILY 12/1/17  Yes Marquez Pearl MD   sulindac (CLINORIL) 200 MG tablet Take 1 tablet (200 mg total) by mouth 2 (two) times a day. 3/27/18  Yes Marquez Pearl MD   vit C/vit E/lutein/min/omega-3 (OCUVITE ORAL) Take by mouth daily.   Yes PROVIDER, HISTORICAL   terbinafine HCl (LAMISIL) 250 mg tablet Take 1 tablet (250 mg total) by mouth daily. 4/23/18 6/4/18  Marquez Pearl MD       Allergies:No Known Allergies    Immunizations:   Immunization History   Administered Date(s) Administered     Influenza, inj, historic,unspecified 11/18/2008     Td,adult,historic,unspecified 03/01/2006     Tdap 12/23/2010       Past Medical History:   Past Medical History:   Diagnosis Date     Degenerative arthritis of hip     right     Fracture of lumbar spine      Hyperlipidemia      Hypertension      Hypogonadism male      Hypothyroidism      Osteoarthritis      Peripheral neuropathy, idiopathic      Sleep apnea     uses CPAP       Past Surgical History:   Past Surgical History:   Procedure Laterality Date     ANKLE SURGERY Right  2013    reconstruction     CARPAL TUNNEL RELEASE Bilateral recent     CARPAL TUNNEL RELEASE Left around 2003     JOINT REPLACEMENT       KNEE ARTHROPLASTY Left      navicular repair Left 1975     ME REVISE KNEE JOINT REPLACE,ALL PARTS Left 3/15/2016    Procedure: #5  LEFT KNEE TOTAL ARTHROPLASTY REVISION;  Surgeon: Finesse Hester MD;  Location: [facility];  Service: Orthopedics     ME TOTAL HIP ARTHROPLASTY      Description: Total Hip Replacement;  Recorded: 08/04/2014;     QUADRICEPS REPAIR Right Dec. 2008     TOTAL HIP ARTHROPLASTY Right 7/22/2014    Procedure: RIGHT TOTAL HIP ARTHROPLASTY;  Surgeon: Finesse Hester MD;  Location: [facility];  Service:        Family History: No family history on file.    Social History:   Social History     Social History     Marital status:      Spouse name: N/A     Number of children: N/A     Years of education: N/A     Occupational History     Not on file.     Social History Main Topics     Smoking status: Light Tobacco Smoker     Smokeless tobacco: Never Used      Comment: cigar- seldom     Alcohol use Yes      Comment: average 2/day     Drug use: No     Sexual activity: Not on file     Other Topics Concern     Not on file     Social History Narrative         Marquez Pearl MD

## 2021-06-24 NOTE — TELEPHONE ENCOUNTER
Left message to call back for: Raj  Information to relay to patient:  LMOM- advised lyrica does come in 150mg and will send in rx for twice daily.    (rx was accidentally send in with three times a day dosing-new rx teed up for auth)

## 2021-06-24 NOTE — TELEPHONE ENCOUNTER
Dr. Pearl,  Please review message below from the patient and advise.  Thank you.  Brittaney DALTON, CMA/CMT....................4:49 PM

## 2021-06-24 NOTE — TELEPHONE ENCOUNTER
Medication Question or Clarification  Who is calling: Patient  What medication are you calling about?: LYRICA 100 mg capsule  What dose do you take?:    LYRICA 100 mg capsule 270 capsule 0 12/19/2018     Sig: TAKE ONE CAPSULE BY MOUTH THREE TIMES DAILY    Sent to pharmacy as: LYRICA 100 mg capsule        How often are you taking the medication?: 3 times daily   Who prescribed the medication?: Dr. Marquez Pearl  What is your question/concern?:  Patient is currently taking 100 mg 3 times daily- He states middle dosage is hard to remember for him and is asking if he can change to twice daily 150 mg tablets. Patient also has Physical scheduled with Dr Pearl 04/23/19.  Pharmacy: [id number] - [place], [place] - [address] AT [facility] [address] to leave a detailed message?: Yes  Site CMT - Please call the pharmacy to obtain any additional needed information.

## 2021-06-24 NOTE — TELEPHONE ENCOUNTER
Left message to call back for: Raj  Information to relay to patient:  LMOM with info- however it looks like he is referring to Lyrica- not gabapentin. Same instructions?

## 2021-06-24 NOTE — TELEPHONE ENCOUNTER
Orders being requested: Replacement C pap machine  Reason service is needed/diagnosis:  Currently using pap machine, however, got lot in piece of luggage at airport- Needs new one.  When are orders needed by:  As soon as able.  Where to send Orders: Fax: [facility] - Fax #- [phone]  Okay to leave detailed message?  Yes

## 2021-06-30 NOTE — PROGRESS NOTES
Progress Notes by Nicholas Sewell MD at 1/18/2021  8:15 AM     Author: Nicholas Sewell MD Service: -- Author Type: Physician    Filed: 1/18/2021  4:40 PM Encounter Date: 1/18/2021 Status: Signed    : Nicholas Sewell MD (Physician)           Surgical Information:  Surgery/Procedure: revision TKA right  Surgery Location: [facility]  Surgeon: Dr. Tadeo  Surgery Date: 1/20/21  Time of Surgery:   Where patient plans to recover: At home with family  Fax number for surgical facility: Note does not need to be faxed, will be available electronically in Epic.      Preop Questions 1/18/2021   Have you ever had a heart attack or stroke? No   Have you ever had surgery on your heart or blood vessels, such as a stent placement, a coronary artery bypass, or surgery on an artery in your head, neck, heart, or legs? No   Do you have chest pain with activity? No   Do you have a history of  heart failure? No   Do you currently have a cold, bronchitis or symptoms of other infection? No   Do you have a cough, shortness of breath, or wheezing? No   Do you or anyone in your family have previous history of blood clots? No   Do you or does anyone in your family have a serious bleeding problem such as prolonged bleeding following surgeries or cuts? No   Have you ever had problems with anemia or been told to take iron pills? No   Have you had any abnormal blood loss such as black, tarry or bloody stools? No   Have you ever had a blood transfusion? No   Are you willing to have a blood transfusion if it is medically needed before, during, or after your surgery? Yes   Have you or any of your relatives ever had problems with anesthesia? No   Do you have sleep apnea, excessive snoring or daytime drowsiness? YES - obstructive sleep apnea (GAVIN)    Do you have a CPAP machine? Yes   Do you have any artifical heart valves or other implanted medical devices like a pacemaker, defibrillator, or continuous glucose monitor? No   Do you have  artificial joints? YES - multiple joint replacements.   Are you allergic to latex? No     ______________________________________________________________________        [facility]    Care coordination - Customized care -  Comprehensive and complex medical care            Date of Service: 1/18/2021  Primary Provider: Anthony Grullon MD    Patient Care Team:  Anthony Grullon MD as PCP - General (Internal Medicine)  Anthony Grullon MD as Assigned PCP  Brandon Tadeo MD as Physician (Orthopedic Surgery)     ______________________________________________________________________     Patient's Pharmacy:      [facility] - [place], [place] - [address] [place] [address]  Phone: [phone] Fax: [phone]    [id number] - [place], [place] - [address] AT [facility] [address]  [address]  [place] [address]  Phone: [phone] Fax: [phone]    [facility] #[id number] - [place], [place] - [address] [facility] [address] AT [facility]  [address] [place] [address] CTR  [facility] [address]  Phone: [phone] Fax: [phone]    [facility] #[id number] - [place], [place] - [address] AT [facility] [address]  [place] [address]  Phone: [phone] Fax: [phone]     Patient's Contacts:  Name Home Phone Work Phone Mobile Phone Relationship Lgl Grd   VANI NOLAN [phone] Spouse    YOCASTA NOLAN [phone]   Child        Patient's Insurance:    Payor: BLUE CROSS / Plan: [place] OF [place] / Product Type: PPO/POS/FFS /          Preoperative examination    Raj Nolan is a 60 y.o. male (1960) who I am asked to see by his surgeon regarding his fitness for upcoming surgery.      Chief Complaint   Patient presents with   ? Pre-op Exam       Subjective:     Patient  comes in today for preoperative evaluation prior to his planned right knee revision.  He was supposed to have this back in November, but the surgery was canceled in light of increased coronavirus numbers in the hospital.    He is having the surgery done by Dr. Tadeo.    We reviewed his high blood pressure.  His blood pressure is normally excellent.  He has had no major issues in relationship to this.  He is comanaged with [facility].  He had a stress test earlier this year which was apparently normal.  The full results of this are not available for review.  He has a younger sibling who has had a stent.  He denies any chest pain or chest pressure.    We reviewed his sleep apnea.  He continues to use CPAP at this time.  It generally works for him.  He has no major concerns related to this.    He has peripheral neuropathy and lumbar stenosis and he is having ongoing evaluation at [facility] related to this.    He has had polyarticular osteoarthritis which is premature.  There is a question of CPPD disease.  He has seen a rheumatologist down at [facility] related to this.    He has had some borderline blood sugars in the past and has lost weight which has helped with this significantly.  He would like his A1c checked again.    We reviewed his other issues noted in the assessment but not specifically addressed in the HPI above.     ______________________________________________________________________     Patient Active Problem List   Diagnosis   ? Testosterone deficiency   ? Overweight   ? Carpal tunnel syndrome   ? S/P total hip arthroplasty   ? Unspecified hypothyroidism   ? Osteoarthrosis, unspecified whether generalized or localized, ankle and foot   ? Osteoarthrosis, unspecified whether generalized or localized, unspecified site   ? Insomnia   ? Night sweats   ? Peripheral neuropathy, idiopathic   ? Hypothyroidism   ? Hypertension   ? Hyperlipidemia   ? Spinal stenosis of lumbar region   ? Peyronie's disease    ? Obstructive sleep apnea syndrome in adult   ? Low back pain   ? Hypogonadism male   ? Erectile dysfunction   ? Primary osteoarthritis involving multiple joints       Past Surgical History:   Procedure Laterality Date   ? ANKLE SURGERY Right 2013    reconstruction   ? CARPAL TUNNEL RELEASE Bilateral recent   ? CARPAL TUNNEL RELEASE Left around 2003   ? JOINT REPLACEMENT     ? KNEE ARTHROPLASTY Left    ? navicular repair Left 1975   ? DE REVISE KNEE JOINT REPLACE,ALL PARTS Left 3/15/2016    Procedure: #5  LEFT KNEE TOTAL ARTHROPLASTY REVISION;  Surgeon: Finesse Hester MD;  Location: [facility];  Service: Orthopedics   ? DE TOTAL HIP ARTHROPLASTY      Description: Total Hip Replacement;  Recorded: 08/04/2014;   ? QUADRICEPS REPAIR Right Dec. 2008   ? TOTAL HIP ARTHROPLASTY Right 7/22/2014    Procedure: RIGHT TOTAL HIP ARTHROPLASTY;  Surgeon: Finesse Hesetr MD;  Location: [facility] OR;  Service:       Social History     Occupational History   ? Not on file   Tobacco Use   ? Smoking status: Light Tobacco Smoker   ? Smokeless tobacco: Never Used   ? Tobacco comment: cigar- seldom   Substance and Sexual Activity   ? Alcohol use: Yes     Comment: average 2/day   ? Drug use: No   ? Sexual activity: Not on file      Social History     Social History Narrative   ? Not on file      History reviewed. No pertinent family history.   Family history is noncontributory otherwise.    Allergies: Patient has no known allergies.     Current medications:  Current Outpatient Medications   Medication Sig   ? atorvastatin (LIPITOR) 40 MG tablet Take 40 mg by mouth at bedtime.   ? cholecalciferol, vitamin D3, 1,000 unit tablet Take 1,000 Units by mouth every evening.    ? coenzyme Q10 100 mg capsule Take 100 mg by mouth daily.   ? levothyroxine (SYNTHROID, LEVOTHROID) 137 MCG tablet TAKE 1 TABLET BY MOUTH EVERY DAY AT 6:00AM   ? loratadine (CLARITIN) 10 mg tablet Take 10 mg by mouth.   ? losartan-hydrochlorothiazide  "(HYZAAR) 50-12.5 mg per tablet Take 1 tablet by mouth daily.   ? magnesium oxide (MAG-OX) 400 mg (241.3 mg magnesium) tablet    ? melatonin 10 mg cap Take 10 mg by mouth.   ? omeprazole (PRILOSEC) 40 MG capsule Take 40 mg by mouth daily before breakfast.   ? pregabalin (LYRICA) 100 MG capsule Take 1 capsule (100 mg total) by mouth 3 (three) times a day.   ? sulindac (CLINORIL) 200 MG tablet Take 1 tablet (200 mg total) by mouth 2 (two) times a day.   ? tadalafiL (CIALIS) 5 MG tablet Take 5 mg by mouth daily as needed for erectile dysfunction.   ? vit C/vit E/lutein/min/omega-3 (OCUVITE ORAL) Take by mouth daily.       Objective:     Wt Readings from Last 3 Encounters:   01/18/21 213 lb (96.6 kg)   11/17/20 206 lb (93.4 kg)   04/23/18 (!) 236 lb (107 kg)     BP Readings from Last 3 Encounters:   01/18/21 110/58   11/17/20 102/62   04/23/18 112/70     /58 (Patient Site: Right Arm, Patient Position: Sitting, Cuff Size: Adult Regular)   Pulse 82   Ht 6' 1\" (1.854 m)   Wt 213 lb (96.6 kg)   SpO2 96%   BMI 28.10 kg/m     Patient is in no acute distress.  Mood good.  Insight good.  Eyes nonicteric.  Pupils equal.  Ears clear.  Nose clear.  Throat clear.  Neck is supple.  No cervical adenopathy.  No thyromegaly.  Heart is regular rate and rhythm.  Lungs clear to auscultation bilaterally.  Respiratory effort is good.  Abdomen is soft, nontender, no hepatosplenomegaly.  Extremities no edema.       Diagnostics:     Results for orders placed or performed in visit on 01/18/21   Hemoglobin   Result Value Ref Range    Hemoglobin 14.1 14.0 - 18.0 g/dL   Glycosylated Hemoglobin A1c   Result Value Ref Range    Hemoglobin A1c 5.5 <=5.6 %     Most Recent EKG     Units 11/17/20  1106   VENTRATE BPM 45   ATRIALRATE BPM 45   QRSDURATION ms 88   QTINTERVAL ms 468   QTCCALC ms 404   P Axis degrees 56   RAXIS degrees 4   TAXIS degrees 28   MUSEDX  Sinus bradycardia  Normal ECG  When compared with ECG of 17-NOV-2016 15:23,  Vent. " rate has decreased BY  30 BPM  Confirmed by TAMMY BECKETT MD LOC:[facility] ([id number]) on 11/17/2020 2:09:49 PM       A Chem-8 is pending at this time.    Impression:     1. Preoperative cardiovascular examination    2. Failed total right knee replacement, initial encounter (H)    3. Essential hypertension    4. Prediabetes    5. Hypothyroidism, unspecified type    6. Primary osteoarthritis involving multiple joints    7. Overweight    8. Peripheral neuropathy, idiopathic        Quality review:     PHQ-2 Total Score: 0 (11/17/2020 10:55 AM)  Depression Follow-up Plan: patient follow-up to return when and if necessary (11/17/2020 10:55 AM)    No data recorded  ______________________________________________________________________     BMI Readings from Last 1 Encounters:   01/18/21 28.10 kg/m        Plan:     1. Okay to proceed with surgery as planned.  2. He should bring his CPAP to the hospital.  3. He does not need to take the losartan hydrochlorothiazide on the night before surgery.  4. He should stop his anti-inflammatories as recommended by his surgeon.  5. He should follow-up with his specialist related to his other health issues.       Nicholas Sewell MD  General Internal Medicine  [facility]    Return in about 1 year (around 1/18/2022), or if symptoms worsen or fail to improve, for follow up with your primary care provider.     No future appointments.      ______________________________________________________________________     Relevant ICD-10 codes and order associations:      ICD-10-CM    1. Preoperative cardiovascular examination  Z01.810    2. Failed total right knee replacement, initial encounter (H)  T84.012A    3. Essential hypertension  I10 Hemoglobin     Basic Metabolic Panel   4. Prediabetes  R73.03 Glycosylated Hemoglobin A1c   5. Hypothyroidism, unspecified type  E03.9    6. Primary osteoarthritis involving multiple joints  M89.49    7. Overweight  E66.3    8. Peripheral  neuropathy, idiopathic  G60.9

## 2021-09-14 NOTE — TELEPHONE ENCOUNTER
Disp Refills Start End YSABEL    pregabalin (LYRICA) 100 MG capsule 270 capsule 0 2/9/2021  No   Sig - Route: Take 1 capsule (100 mg total) by mouth 3 (three) times a day. - Oral   Sent to pharmacy as: pregabalin 100 mg capsule (LYRICA)   E-Prescribing Status: Receipt confirmed by pharmacy (2/9/2021  9:33 AM CST)     Former patient of [facility] & has not established care with another provider.  Please assign refill request to covering provider per Clinic standard process.    Routing refill request to provider for review/approval because:  Controlled substance    Last Written Prescription Date:  2/9/21  Last Fill Quantity: 270,  # refills: 0   Last office visit provider:  1/18/21     Requested Prescriptions   Pending Prescriptions Disp Refills     pregabalin (LYRICA) 300 MG capsule 90 capsule 0     Sig: Take 100 capsules (30,000 mg) by mouth 3 times daily       There is no refill protocol information for this order          Lanny Moss RN 09/14/21 2:26 PM

## 2021-09-15 RX ORDER — PREGABALIN 300 MG/1
100 CAPSULE ORAL 3 TIMES DAILY
Qty: 90 CAPSULE | Refills: 0 | OUTPATIENT
Start: 2021-09-15

## 2021-09-15 RX ORDER — LOSARTAN POTASSIUM AND HYDROCHLOROTHIAZIDE 12.5; 5 MG/1; MG/1
1 TABLET ORAL DAILY
Qty: 90 TABLET | Refills: 3 | OUTPATIENT
Start: 2021-09-15

## 2021-09-29 ENCOUNTER — TELEPHONE (OUTPATIENT)
Dept: NURSING | Facility: CLINIC | Age: 61
End: 2021-09-29

## 2021-09-29 NOTE — TELEPHONE ENCOUNTER
Pt takes atorvastatin 40mg daily and his PCP retired. He is out of this medication and is in need of a refill.  It looks like Pt saw Dr. Sewell on 1/19/2021 for a preoperative cardiovascular exam.     He is also looking to establish a new PCP within [facility] and is asking for recommendation on providers or if Dr. Sewell is taking new patients.     Long time patient of Dr. Pearl and retired.    Pt has Mychart, please message pt on [facility] regarding ability to refill Atorvastatin or how to proceed.     Pt was also transferred to scheduling to make an appointment to establish new PCP.     Edwina Vaughn RN  [facility] Nurse Advisor 2:49 PM 9/29/2021

## 2021-09-30 NOTE — TELEPHONE ENCOUNTER
Has appointment tomorrow and can be addressed at that time.    I am not taking new patients.    Nicholas Sewell MD  General Internal Medicine  [facility]  9/29/2021, 10:16 PM

## 2021-10-01 DIAGNOSIS — I10 ESSENTIAL HYPERTENSION: Primary | ICD-10-CM

## 2021-10-01 NOTE — TELEPHONE ENCOUNTER
..Reason for Call:  Medication or medication refill:    Do you use a [facility]?  Name of the pharmacy and phone number for the current request:  [id number] - [place], [place] - [address] AT [facility] [address]  [phone]    Name of the medication requested: losartan-hydrochlorothiazide (HYZAAR) 50-12.5 MG per tablet    Other request: Patient is establishing care with Julianne Sanches on 10/11/21 and has been out of his medication for over (1) week. Patient has been directed to us for the refill. Please advise and let patient know if we can help him with this.     Can we leave a detailed message on this number? YES    Phone number patient can be reached at: Home number on file [phone] (home)    Best Time: ANY    Call taken on 10/1/2021 at 1:40 PM by BONI Barbour

## 2021-10-03 ENCOUNTER — HEALTH MAINTENANCE LETTER (OUTPATIENT)
Age: 61
End: 2021-10-03

## 2021-10-04 RX ORDER — LOSARTAN POTASSIUM AND HYDROCHLOROTHIAZIDE 12.5; 5 MG/1; MG/1
1 TABLET ORAL DAILY
Qty: 30 TABLET | Refills: 0 | Status: SHIPPED | OUTPATIENT
Start: 2021-10-04 | End: 2021-11-09

## 2021-10-20 ENCOUNTER — OFFICE VISIT (OUTPATIENT)
Dept: INTERNAL MEDICINE | Facility: CLINIC | Age: 61
End: 2021-10-20
Payer: COMMERCIAL

## 2021-10-20 VITALS
DIASTOLIC BLOOD PRESSURE: 60 MMHG | BODY MASS INDEX: 27.22 KG/M2 | WEIGHT: 201 LBS | SYSTOLIC BLOOD PRESSURE: 112 MMHG | HEIGHT: 72 IN

## 2021-10-20 DIAGNOSIS — G89.29 CHRONIC RIGHT-SIDED LOW BACK PAIN WITH RIGHT-SIDED SCIATICA: ICD-10-CM

## 2021-10-20 DIAGNOSIS — Z13.0 SCREENING FOR ENDOCRINE, NUTRITIONAL, METABOLIC AND IMMUNITY DISORDER: ICD-10-CM

## 2021-10-20 DIAGNOSIS — E03.9 HYPOTHYROIDISM, UNSPECIFIED TYPE: ICD-10-CM

## 2021-10-20 DIAGNOSIS — G47.33 OSA (OBSTRUCTIVE SLEEP APNEA): ICD-10-CM

## 2021-10-20 DIAGNOSIS — Z13.21 SCREENING FOR ENDOCRINE, NUTRITIONAL, METABOLIC AND IMMUNITY DISORDER: ICD-10-CM

## 2021-10-20 DIAGNOSIS — Z13.228 SCREENING FOR ENDOCRINE, NUTRITIONAL, METABOLIC AND IMMUNITY DISORDER: ICD-10-CM

## 2021-10-20 DIAGNOSIS — Z76.89 ENCOUNTER TO ESTABLISH CARE: ICD-10-CM

## 2021-10-20 DIAGNOSIS — I10 ESSENTIAL HYPERTENSION: ICD-10-CM

## 2021-10-20 DIAGNOSIS — H61.23 BILATERAL IMPACTED CERUMEN: ICD-10-CM

## 2021-10-20 DIAGNOSIS — Z00.00 ENCOUNTER FOR ANNUAL PHYSICAL EXAM: ICD-10-CM

## 2021-10-20 DIAGNOSIS — E78.00 HYPERCHOLESTEROLEMIA: ICD-10-CM

## 2021-10-20 DIAGNOSIS — E29.1 HYPOGONADISM MALE: ICD-10-CM

## 2021-10-20 DIAGNOSIS — Z13.29 SCREENING FOR ENDOCRINE, NUTRITIONAL, METABOLIC AND IMMUNITY DISORDER: ICD-10-CM

## 2021-10-20 DIAGNOSIS — M54.41 CHRONIC RIGHT-SIDED LOW BACK PAIN WITH RIGHT-SIDED SCIATICA: ICD-10-CM

## 2021-10-20 DIAGNOSIS — Z12.5 SCREENING PSA (PROSTATE SPECIFIC ANTIGEN): ICD-10-CM

## 2021-10-20 DIAGNOSIS — E66.3 OVERWEIGHT: ICD-10-CM

## 2021-10-20 PROBLEM — Z96.659 FAILED TOTAL KNEE ARTHROPLASTY, INITIAL ENCOUNTER (H): Status: ACTIVE | Noted: 2021-01-20

## 2021-10-20 PROBLEM — R73.09 OTHER ABNORMAL GLUCOSE: Status: ACTIVE | Noted: 2018-04-23

## 2021-10-20 PROBLEM — N48.6 PEYRONIE'S DISEASE: Status: ACTIVE | Noted: 2020-02-18

## 2021-10-20 PROBLEM — G60.9 PERIPHERAL NEUROPATHY, IDIOPATHIC: Status: ACTIVE | Noted: 2020-02-18

## 2021-10-20 PROBLEM — G56.00 CARPAL TUNNEL SYNDROME: Status: ACTIVE | Noted: 2021-10-20

## 2021-10-20 PROBLEM — N52.9 ERECTILE DYSFUNCTION: Status: ACTIVE | Noted: 2020-02-18

## 2021-10-20 PROBLEM — T84.018A FAILED TOTAL KNEE ARTHROPLASTY, INITIAL ENCOUNTER (H): Status: ACTIVE | Noted: 2021-01-20

## 2021-10-20 PROBLEM — M15.0 PRIMARY OSTEOARTHRITIS INVOLVING MULTIPLE JOINTS: Status: ACTIVE | Noted: 2021-01-18

## 2021-10-20 PROBLEM — M54.50 LOW BACK PAIN: Status: ACTIVE | Noted: 2021-01-18

## 2021-10-20 LAB
ALBUMIN SERPL-MCNC: 4.5 G/DL (ref 3.5–5)
ALP SERPL-CCNC: 99 U/L (ref 45–120)
ALT SERPL W P-5'-P-CCNC: 27 U/L (ref 0–45)
ANION GAP SERPL CALCULATED.3IONS-SCNC: 10 MMOL/L (ref 5–18)
AST SERPL W P-5'-P-CCNC: 26 U/L (ref 0–40)
BASOPHILS # BLD AUTO: 0 10E3/UL (ref 0–0.2)
BASOPHILS NFR BLD AUTO: 1 %
BILIRUB SERPL-MCNC: 1.2 MG/DL (ref 0–1)
BUN SERPL-MCNC: 25 MG/DL (ref 8–22)
CALCIUM SERPL-MCNC: 10.2 MG/DL (ref 8.5–10.5)
CHLORIDE BLD-SCNC: 103 MMOL/L (ref 98–107)
CHOLEST SERPL-MCNC: 154 MG/DL
CO2 SERPL-SCNC: 26 MMOL/L (ref 22–31)
CREAT SERPL-MCNC: 1.05 MG/DL (ref 0.7–1.3)
EOSINOPHIL # BLD AUTO: 0.4 10E3/UL (ref 0–0.7)
EOSINOPHIL NFR BLD AUTO: 7 %
ERYTHROCYTE [DISTWIDTH] IN BLOOD BY AUTOMATED COUNT: 12.4 % (ref 10–15)
FASTING STATUS PATIENT QL REPORTED: YES
GFR SERPL CREATININE-BSD FRML MDRD: 76 ML/MIN/1.73M2
GLUCOSE BLD-MCNC: 113 MG/DL (ref 70–125)
HCT VFR BLD AUTO: 43.8 % (ref 40–53)
HDLC SERPL-MCNC: 58 MG/DL
HGB BLD-MCNC: 14.6 G/DL (ref 13.3–17.7)
IMM GRANULOCYTES # BLD: 0 10E3/UL
IMM GRANULOCYTES NFR BLD: 0 %
LDLC SERPL CALC-MCNC: 83 MG/DL
LYMPHOCYTES # BLD AUTO: 1.4 10E3/UL (ref 0.8–5.3)
LYMPHOCYTES NFR BLD AUTO: 21 %
MCH RBC QN AUTO: 32.4 PG (ref 26.5–33)
MCHC RBC AUTO-ENTMCNC: 33.3 G/DL (ref 31.5–36.5)
MCV RBC AUTO: 97 FL (ref 78–100)
MONOCYTES # BLD AUTO: 0.6 10E3/UL (ref 0–1.3)
MONOCYTES NFR BLD AUTO: 10 %
NEUTROPHILS # BLD AUTO: 4 10E3/UL (ref 1.6–8.3)
NEUTROPHILS NFR BLD AUTO: 62 %
PLATELET # BLD AUTO: 229 10E3/UL (ref 150–450)
POTASSIUM BLD-SCNC: 4.7 MMOL/L (ref 3.5–5)
PROT SERPL-MCNC: 7.9 G/DL (ref 6–8)
PSA SERPL-MCNC: 1.86 UG/L (ref 0–4.5)
RBC # BLD AUTO: 4.51 10E6/UL (ref 4.4–5.9)
SODIUM SERPL-SCNC: 139 MMOL/L (ref 136–145)
TRIGL SERPL-MCNC: 64 MG/DL
TSH SERPL DL<=0.005 MIU/L-ACNC: 0.95 UIU/ML (ref 0.3–5)
WBC # BLD AUTO: 6.5 10E3/UL (ref 4–11)

## 2021-10-20 PROCEDURE — 99213 OFFICE O/P EST LOW 20 MIN: CPT | Mod: 25 | Performed by: NURSE PRACTITIONER

## 2021-10-20 PROCEDURE — 36415 COLL VENOUS BLD VENIPUNCTURE: CPT | Performed by: NURSE PRACTITIONER

## 2021-10-20 PROCEDURE — 90686 IIV4 VACC NO PRSV 0.5 ML IM: CPT | Performed by: NURSE PRACTITIONER

## 2021-10-20 PROCEDURE — 99396 PREV VISIT EST AGE 40-64: CPT | Mod: 25 | Performed by: NURSE PRACTITIONER

## 2021-10-20 PROCEDURE — 80061 LIPID PANEL: CPT | Performed by: NURSE PRACTITIONER

## 2021-10-20 PROCEDURE — 90471 IMMUNIZATION ADMIN: CPT | Performed by: NURSE PRACTITIONER

## 2021-10-20 PROCEDURE — 84403 ASSAY OF TOTAL TESTOSTERONE: CPT | Performed by: NURSE PRACTITIONER

## 2021-10-20 PROCEDURE — G0103 PSA SCREENING: HCPCS | Performed by: NURSE PRACTITIONER

## 2021-10-20 PROCEDURE — 80050 GENERAL HEALTH PANEL: CPT | Performed by: NURSE PRACTITIONER

## 2021-10-20 RX ORDER — TADALAFIL 5 MG/1
TABLET ORAL
COMMUNITY
Start: 2021-06-07 | End: 2022-07-11

## 2021-10-20 RX ORDER — OMEPRAZOLE 40 MG/1
CAPSULE, DELAYED RELEASE ORAL
COMMUNITY
Start: 2021-09-08 | End: 2022-02-23

## 2021-10-20 RX ORDER — LORATADINE 10 MG/1
10 TABLET ORAL
COMMUNITY
End: 2023-03-08

## 2021-10-20 RX ORDER — ATORVASTATIN CALCIUM 40 MG/1
40 TABLET, FILM COATED ORAL
COMMUNITY
Start: 2020-03-01 | End: 2022-07-11

## 2021-10-20 RX ORDER — SULINDAC 200 MG/1
200 TABLET ORAL
COMMUNITY
Start: 2020-09-17 | End: 2022-02-18

## 2021-10-20 ASSESSMENT — MIFFLIN-ST. JEOR: SCORE: 1741.79

## 2021-10-20 NOTE — PATIENT INSTRUCTIONS
Preventive Health Recommendations  Male Ages 50 - 64  Your labs are processing at this time, I will release results on my chart once they are back.    To get your sleep study scheduled call [phone].    Yearly exam:             See your health care provider every year in order to  o   Review health changes.   o   Discuss preventive care.    o   Review your medicines if your doctor has prescribed any.     Have a cholesterol test every 5 years, or more frequently if you are at risk for high cholesterol/heart disease.     Have a diabetes test (fasting glucose) every three years. If you are at risk for diabetes, you should have this test more often.     Have a colonoscopy at age 50, or have a yearly FIT test (stool test). These exams will check for colon cancer.      Talk with your health care provider about whether or not a prostate cancer screening test (PSA) is right for you.    You should be tested each year for STDs (sexually transmitted diseases), if you re at risk.     Shots: Get a flu shot each year. Get a tetanus shot every 10 years.     Nutrition:    Eat at least 5 servings of fruits and vegetables daily.     Eat whole-grain bread, whole-wheat pasta and brown rice instead of white grains and rice.     Get adequate Calcium and Vitamin D.     Lifestyle    Exercise for at least 150 minutes a week (30 minutes a day, 5 days a week). This will help you control your weight and prevent disease.     Limit alcohol to one drink per day.     No smoking.     Wear sunscreen to prevent skin cancer.     See your dentist every six months for an exam and cleaning.     See your eye doctor every 1 to 2 years.

## 2021-10-20 NOTE — PROGRESS NOTES
SUBJECTIVE:   CC: Raj Nolan is an 60 year old male who presents for preventative health visit.     Patient has been advised of split billing requirements and indicates understanding: Yes  The patient presents today to establish care and for his annual exam.    He is fasted today.    He reports being very frusterated with the [facility]. It was very hard for him to get an appointment to be seen. He previously was a patient of Dr. Lopez, then of Dr. Grullon.    He reports a past surgical history of bilateral total knees, and hips, with some revisions. He also has had bilateral carpal tunnel releases, a right ankle surgery, and a quadriceps tendon repair.    He reports that the is  with four children, ages 33, 31, 30, 28. His 30 year old daughter had metastatic breast cancer, has survived, just ran the [employer].    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    He reports that he smokes an occasional cigar when golfing. He denies any drug use. He reports having 2 drinks 2-3x.week.    He reports that he is exercising on a regular basis.    He would like a flu shot today.    He reports recently losing 35 pounds, has stopped using his CPAP. We will repeat a sleep study to see if he still needs this.    He reports that he would like his testosterone checked today as well.     Mom is alive, has hypertension and high cholesterol. Dad has passed, had myelodysplastic syndrome. No other cancers in the family.      Healthy Habits:     Getting at least 3 servings of Calcium per day:  NO    Bi-annual eye exam:  Yes    Dental care twice a year:  Yes    Sleep apnea or symptoms of sleep apnea:  Sleep apnea    Diet:  Regular (no restrictions)    Frequency of exercise:  2-3 days/week    Duration of exercise:  30-45 minutes    Taking medications regularly:  Yes    Medication side effects:  Not applicable    PHQ-2 Total Score: 0    Additional concerns  today:  No      Today's PHQ-2 Score:   PHQ-2 ( 1999 [employer]) 10/20/2021   Q1: Little interest or pleasure in doing things 0   Q2: Feeling down, depressed or hopeless 0   PHQ-2 Score 0   Q1: Little interest or pleasure in doing things Not at all   Q2: Feeling down, depressed or hopeless Not at all   PHQ-2 Score 0       Abuse: Current or Past(Physical, Sexual or Emotional)- No  Do you feel safe in your environment? Yes    Have you ever done Advance Care Planning? (For example, a Health Directive, POLST, or a discussion with a medical provider or your loved ones about your wishes): No, advance care planning information given to patient to review.  Patient plans to discuss their wishes with loved ones or provider.      Social History     Tobacco Use     Smoking status: Light Tobacco Smoker     Smokeless tobacco: Never Used     Tobacco comment: cigar- seldom   Substance Use Topics     Alcohol use: Yes     Comment: Alcoholic Drinks/day: average 2/day     If you drink alcohol do you typically have >3 drinks per day or >7 drinks per week? No    Alcohol Use 10/20/2021   Prescreen: >3 drinks/day or >7 drinks/week? No   Prescreen: >3 drinks/day or >7 drinks/week? -     Last PSA:   Prostate Specific Antigen Screen   Date Value Ref Range Status   04/23/2018 1.9 0.0 - 3.5 ng/mL Final       Reviewed orders with patient. Reviewed health maintenance and updated orders accordingly - Yes  Lab work is in process    Reviewed and updated as needed this visit by clinical staff  Tobacco  Allergies  Meds   Med Hx  Surg Hx  Fam Hx  Soc Hx        Reviewed and updated as needed this visit by Provider  Tobacco  Allergies  Meds   Med Hx  Surg Hx  Fam Hx  Soc Hx       Past Medical History:   Diagnosis Date     Acid reflux      Degenerative arthritis of hip     right     Fracture of lumbar spine (H)      Hyperlipidemia      Hypertension      Hypogonadism male      Hypothyroidism      Osteoarthritis      Peripheral neuropathy, idiopathic  "     Primary osteoarthritis involving multiple joints 1/18/2021     Sleep apnea     uses CPAP      Past Surgical History:   Procedure Laterality Date     ANKLE SURGERY Right 2013    reconstruction     ARTHROPLASTY KNEE Left      ARTHROPLASTY REVISION KNEE Right 1/20/2021    Procedure: RIGHT REVISION, TOTAL KNEE  ARTHROPLASTY;  Surgeon: Brandon Tadeo MD;  Location: [facility];  Service: Orthopedics     C REVISE KNEE JOINT REPLACE,ALL PARTS Left 3/15/2016    Procedure: #5  LEFT KNEE TOTAL ARTHROPLASTY REVISION;  Surgeon: Finesse Hester MD;  Location: [facility];  Service: Orthopedics     C TOTAL HIP ARTHROPLASTY      Description: Total Hip Replacement;  Recorded: 08/04/2014;     JOINT REPLACEMENT       OTHER SURGICAL HISTORY Left 1975    navicular repair     QUADRICEPS REPAIR Right Dec. 2008     RELEASE CARPAL TUNNEL Bilateral recent     RELEASE CARPAL TUNNEL Left around 2003     TOTAL HIP ARTHROPLASTY Right 7/22/2014    Procedure: RIGHT TOTAL HIP ARTHROPLASTY;  Surgeon: Finesse Hester MD;  Location: [facility];  Service:        Review of Systems  CONSTITUTIONAL: NEGATIVE for fever, chills, change in weight  INTEGUMENTARY/SKIN: NEGATIVE for worrisome rashes, moles or lesions  EYES: NEGATIVE for vision changes or irritation  ENT: NEGATIVE for ear, mouth and throat problems  RESP: NEGATIVE for significant cough or SOB  CV: NEGATIVE for chest pain, palpitations or peripheral edema  GI: NEGATIVE for nausea, abdominal pain, heartburn, or change in bowel habits   male: negative for dysuria, hematuria, decreased urinary stream, erectile dysfunction, urethral discharge  MUSCULOSKELETAL: NEGATIVE for significant arthralgias or myalgia  NEURO: NEGATIVE for weakness, dizziness or paresthesias  PSYCHIATRIC: NEGATIVE for changes in mood or affect    OBJECTIVE:   /60 (BP Location: Right arm, Patient Position: Sitting, Cuff Size: Adult Large)   Ht 1.816 m (5' 11.5\")   Wt 91.2 kg (201 " lb)   BMI 27.64 kg/m      Physical Exam  GENERAL: healthy, alert and no distress  EYES: Eyes grossly normal to inspection, PERRL and conjunctivae and sclerae normal  HENT: ear canals normal, TM's occluded with cerumen, nose and mouth without ulcers or lesions  NECK: no adenopathy, no asymmetry, masses, or scars and thyroid normal to palpation  RESP: lungs clear to auscultation - no rales, rhonchi or wheezes  CV: regular rate and rhythm, normal S1 S2, no S3 or S4, no murmur, click or rub, no peripheral edema and peripheral pulses strong  ABDOMEN: soft, nontender, no hepatosplenomegaly, no masses and bowel sounds normal  MS: no gross musculoskeletal defects noted, no edema  SKIN: no suspicious lesions or rashes  NEURO: Normal strength and tone, mentation intact and speech normal  PSYCH: mentation appears normal, affect normal/bright    Diagnostic Test Results: Reviewed colonoscopy. Follow up due 12/2024.  Labs reviewed in Epic    ASSESSMENT/PLAN:   Raj was seen today for physical.    Diagnoses and all orders for this visit:    Encounter for annual physical exam: Completed today. Fasted labs ordered. Flu shot given.    Encounter to establish care: Care established today.     Essential hypertension: He continues on Hyzaar. Blood pressure today in office was 112/60. Stable.     Hypercholesterolemia: He continues on Atorvastatin. Will check lipids today.   -     Lipid panel reflex to direct LDL Fasting; Future    Hypothyroidism, unspecified type: Hx of this. Continues on Levothyroxine 137mcg daily, will recheck TSH.   -     TSH with free T4 reflex; Future    Hypogonadism male: Hx of this. Will recheck levels.   -     Testosterone, total; Future    Overweight: BMI today was 27.64. He continues to work on diet and exercise.     GAVIN (obstructive sleep apnea): He reports stopping using his CPAP at night. He has lost weight, has not been snoring. Will recheck a sleep study.   -     Sleep Home Study Referral;  "Future    Chronic right-sided low back pain with right-sided sciatica: Chronic low back pain. He sees [facility] for this. Stable.     Bilateral impacted cerumen: Bilateral cerumen impaction noted on exam today. Will try OTC drops. Follow up if symptoms persist.     Screening PSA (prostate specific antigen)  -     PSA, screen; Future    Screening for endocrine, nutritional, metabolic and immunity disorder  -     CBC with platelets and differential; Future  -     Comprehensive metabolic panel (BMP + Alb, Alk Phos, ALT, AST, Total. Bili, TP); Future  -     Lipid panel reflex to direct LDL Fasting; Future    Other orders  -     REVIEW OF HEALTH MAINTENANCE PROTOCOL ORDERS  -     TN FLU VAC PRESRV FREE QUAD SPLIT VIR IM  MONTHS IM    Patient has been advised of split billing requirements and indicates understanding: Yes  COUNSELING:   Reviewed preventive health counseling, as reflected in patient instructions       Healthy diet/nutrition       Vision screening    Estimated body mass index is 27.64 kg/m  as calculated from the following:    Height as of this encounter: 1.816 m (5' 11.5\").    Weight as of this encounter: 91.2 kg (201 lb).     Weight management plan: Discussed healthy diet and exercise guidelines    He reports that he has been smoking. He has never used smokeless tobacco.  Tobacco Cessation Action Plan:   Information offered: Patient not interested at this time      Counseling Resources:  ATP IV Guidelines  Pooled Cohorts Equation Calculator  FRAX Risk Assessment  ICSI Preventive Guidelines  Dietary Guidelines for Americans, 2010  USDA's MyPlate  ASA Prophylaxis  Lung CA Screening    Julianne Sanches CNP  [facility]  "

## 2021-10-22 ENCOUNTER — MYC MEDICAL ADVICE (OUTPATIENT)
Dept: INTERNAL MEDICINE | Facility: CLINIC | Age: 61
End: 2021-10-22
Payer: COMMERCIAL

## 2021-10-22 DIAGNOSIS — G47.19 EXCESSIVE DAYTIME SLEEPINESS: Primary | ICD-10-CM

## 2021-10-22 LAB — TESTOST SERPL-MCNC: 271 NG/DL (ref 221–716)

## 2021-11-08 DIAGNOSIS — I10 ESSENTIAL HYPERTENSION: ICD-10-CM

## 2021-11-09 RX ORDER — LOSARTAN POTASSIUM AND HYDROCHLOROTHIAZIDE 12.5; 5 MG/1; MG/1
1 TABLET ORAL DAILY
Qty: 90 TABLET | Refills: 3 | Status: SHIPPED | OUTPATIENT
Start: 2021-11-09 | End: 2022-11-25

## 2021-11-09 NOTE — TELEPHONE ENCOUNTER
"Last Written Prescription Date:  10/4/21  Last Fill Quantity: 30,  # refills: 0   Last office visit provider:  10/20/21     Requested Prescriptions   Pending Prescriptions Disp Refills     losartan-hydrochlorothiazide (HYZAAR) 50-12.5 MG tablet [Pharmacy Med Name: LOSARTAN/HCTZ 50/12.5MG TABLETS] 90 tablet      Sig: TAKE 1 TABLET BY MOUTH DAILY       Angiotensin-II Receptors Passed - 11/8/2021  7:49 AM        Passed - Last blood pressure under 140/90 in past 12 months     BP Readings from Last 3 Encounters:   10/20/21 112/60   01/18/21 110/58   11/17/20 102/62                 Passed - Recent (12 mo) or future (30 days) visit within the authorizing provider's specialty     Patient has had an office visit with the authorizing provider or a provider within the authorizing providers department within the previous 12 mos or has a future within next 30 days. See \"Patient Info\" tab in [facility], or \"Choose Columns\" in Meds & Orders section of the refill encounter.              Passed - Medication is active on med list        Passed - Patient is age 18 or older        Passed - Normal serum creatinine on file in past 12 months     Recent Labs   Lab Test 10/20/21  0940   CR 1.05       Ok to refill medication if creatinine is low          Passed - Normal serum potassium on file in past 12 months     Recent Labs   Lab Test 10/20/21  0940   POTASSIUM 4.7                   Diuretics (Including Combos) Protocol Passed - 11/8/2021  7:49 AM        Passed - Blood pressure under 140/90 in past 12 months     BP Readings from Last 3 Encounters:   10/20/21 112/60   01/18/21 110/58   11/17/20 102/62                 Passed - Recent (12 mo) or future (30 days) visit within the authorizing provider's specialty     Patient has had an office visit with the authorizing provider or a provider within the authorizing providers department within the previous 12 mos or has a future within next 30 days. See \"Patient Info\" tab in [facility], or \"Choose " "Columns\" in Meds & Orders section of the refill encounter.              Passed - Medication is active on med list        Passed - Patient is age 18 or older        Passed - Normal serum creatinine on file in past 12 months     Recent Labs   Lab Test 10/20/21  0940   CR 1.05              Passed - Normal serum potassium on file in past 12 months     Recent Labs   Lab Test 10/20/21  0940   POTASSIUM 4.7                    Passed - Normal serum sodium on file in past 12 months     Recent Labs   Lab Test 10/20/21  0940                      Florencio Sharp RN 11/09/21 3:08 PM  "

## 2021-11-30 ENCOUNTER — TRANSFERRED RECORDS (OUTPATIENT)
Dept: HEALTH INFORMATION MANAGEMENT | Facility: CLINIC | Age: 61
End: 2021-11-30
Payer: COMMERCIAL

## 2021-12-07 ENCOUNTER — TRANSFERRED RECORDS (OUTPATIENT)
Dept: HEALTH INFORMATION MANAGEMENT | Facility: CLINIC | Age: 61
End: 2021-12-07
Payer: COMMERCIAL

## 2021-12-09 ENCOUNTER — TRANSFERRED RECORDS (OUTPATIENT)
Dept: HEALTH INFORMATION MANAGEMENT | Facility: CLINIC | Age: 61
End: 2021-12-09
Payer: COMMERCIAL

## 2021-12-18 DIAGNOSIS — E03.9 HYPOTHYROIDISM, UNSPECIFIED TYPE: Primary | ICD-10-CM

## 2021-12-21 RX ORDER — LEVOTHYROXINE SODIUM 137 UG/1
TABLET ORAL
Qty: 90 TABLET | Refills: 3 | Status: SHIPPED | OUTPATIENT
Start: 2021-12-21 | End: 2023-01-24

## 2021-12-21 NOTE — TELEPHONE ENCOUNTER
"Outpatient Medication Detail     Disp Refills Start End YSABEL   levothyroxine (SYNTHROID, LEVOTHROID) 137 MCG tablet 90 tablet 3 12/10/2020  No   Sig: TAKE 1 TABLET BY MOUTH EVERY DAY AT 6:00AM   Sent to pharmacy as: levothyroxine 137 mcg tablet (SYNTHROID, LEVOTHROID)   E-Prescribing Status: Receipt confirmed by pharmacy (12/10/2020  3:52 PM CST)       Last office visit provider:  10/20/21     Requested Prescriptions   Pending Prescriptions Disp Refills     levothyroxine (SYNTHROID/LEVOTHROID) 137 MCG tablet [Pharmacy Med Name: LEVOTHYROXINE 0.137MG (137MCG) TAB] 4 tablet      Sig: TAKE 1 TABLET BY MOUTH EVERY DAY AT 6:00 AM       Thyroid Protocol Passed - 12/21/2021  2:55 AM        Passed - Patient is 12 years or older        Passed - Recent (12 mo) or future (30 days) visit within the authorizing provider's specialty     Patient has had an office visit with the authorizing provider or a provider within the authorizing providers department within the previous 12 mos or has a future within next 30 days. See \"Patient Info\" tab in inbasket, or \"Choose Columns\" in Meds & Orders section of the refill encounter.              Passed - Medication is active on med list        Passed - Normal TSH on file in past 12 months     Recent Labs   Lab Test 10/20/21  0940   TSH 0.95                   Florencio Sharp RN 12/21/21 7:11 AM  "

## 2022-02-17 ENCOUNTER — MYC MEDICAL ADVICE (OUTPATIENT)
Dept: INTERNAL MEDICINE | Facility: CLINIC | Age: 62
End: 2022-02-17
Payer: COMMERCIAL

## 2022-02-17 DIAGNOSIS — Z96.651 AFTERCARE FOLLOWING RIGHT KNEE JOINT REPLACEMENT SURGERY: Primary | ICD-10-CM

## 2022-02-17 DIAGNOSIS — Z47.1 AFTERCARE FOLLOWING RIGHT KNEE JOINT REPLACEMENT SURGERY: Primary | ICD-10-CM

## 2022-02-17 DIAGNOSIS — K21.00 GASTROESOPHAGEAL REFLUX DISEASE WITH ESOPHAGITIS WITHOUT HEMORRHAGE: ICD-10-CM

## 2022-02-18 RX ORDER — SULINDAC 200 MG/1
200 TABLET ORAL DAILY
Qty: 180 TABLET | Refills: 0 | Status: SHIPPED | OUTPATIENT
Start: 2022-02-18 | End: 2022-04-15

## 2022-02-23 RX ORDER — OMEPRAZOLE 40 MG/1
CAPSULE, DELAYED RELEASE ORAL
Qty: 90 CAPSULE | Refills: 3 | Status: SHIPPED | OUTPATIENT
Start: 2022-02-23 | End: 2023-02-15

## 2022-04-15 ENCOUNTER — MYC REFILL (OUTPATIENT)
Dept: INTERNAL MEDICINE | Facility: CLINIC | Age: 62
End: 2022-04-15
Payer: COMMERCIAL

## 2022-04-15 ENCOUNTER — MYC MEDICAL ADVICE (OUTPATIENT)
Dept: INTERNAL MEDICINE | Facility: CLINIC | Age: 62
End: 2022-04-15
Payer: COMMERCIAL

## 2022-04-15 DIAGNOSIS — R52 PAIN: Primary | ICD-10-CM

## 2022-04-15 DIAGNOSIS — Z96.651 AFTERCARE FOLLOWING RIGHT KNEE JOINT REPLACEMENT SURGERY: ICD-10-CM

## 2022-04-15 DIAGNOSIS — Z47.1 AFTERCARE FOLLOWING RIGHT KNEE JOINT REPLACEMENT SURGERY: ICD-10-CM

## 2022-04-18 RX ORDER — SULINDAC 200 MG/1
200 TABLET ORAL 2 TIMES DAILY
Qty: 180 TABLET | Refills: 0 | Status: SHIPPED | OUTPATIENT
Start: 2022-04-18 | End: 2022-08-05

## 2022-04-18 RX ORDER — SULINDAC 200 MG/1
200 TABLET ORAL DAILY
Qty: 180 TABLET | Refills: 0 | Status: SHIPPED | OUTPATIENT
Start: 2022-04-18 | End: 2023-03-08

## 2022-04-18 NOTE — TELEPHONE ENCOUNTER
"Routing refill request to provider for review/approval because:  Refill too soon.     Last Written Prescription Date:  2/18/2022  Last Fill Quantity: 180,  # refills: 0   Last office visit provider:  10/20/2021     Requested Prescriptions   Pending Prescriptions Disp Refills     sulindac (CLINORIL) 200 MG tablet 180 tablet 0     Sig: Take 1 tablet (200 mg) by mouth daily       NSAID Medications Passed - 4/15/2022  3:42 PM        Passed - Blood pressure under 140/90 in past 12 months     BP Readings from Last 3 Encounters:   10/20/21 112/60   01/18/21 110/58   11/17/20 102/62                 Passed - Normal ALT on file in past 12 months     Recent Labs   Lab Test 10/20/21  0940   ALT 27             Passed - Normal AST on file in past 12 months     Recent Labs   Lab Test 10/20/21  0940   AST 26             Passed - Recent (12 mo) or future (30 days) visit within the authorizing provider's specialty     Patient has had an office visit with the authorizing provider or a provider within the authorizing providers department within the previous 12 mos or has a future within next 30 days. See \"Patient Info\" tab in inbasket, or \"Choose Columns\" in Meds & Orders section of the refill encounter.              Passed - Patient is age 6-64 years        Passed - Normal CBC on file in past 12 months     Recent Labs   Lab Test 10/20/21  0940   WBC 6.5   RBC 4.51   HGB 14.6   HCT 43.8                    Passed - Medication is active on med list        Passed - Normal serum creatinine on file in past 12 months     Recent Labs   Lab Test 10/20/21  0940   CR 1.05       Ok to refill medication if creatinine is low               Elodia Araujo RN 04/17/22 11:05 PM  "

## 2022-07-11 ENCOUNTER — MYC MEDICAL ADVICE (OUTPATIENT)
Dept: INTERNAL MEDICINE | Facility: CLINIC | Age: 62
End: 2022-07-11

## 2022-07-11 DIAGNOSIS — E78.00 HYPERCHOLESTEROLEMIA: Primary | ICD-10-CM

## 2022-07-11 DIAGNOSIS — N52.8 OTHER MALE ERECTILE DYSFUNCTION: ICD-10-CM

## 2022-07-11 NOTE — TELEPHONE ENCOUNTER
"Routing refill request to provider for review/approval because:  Medication is reported/historical    Last Written Prescription Date:    Last Fill Quantity: ,  # refills:    Last office visit provider:  10/20/21     Requested Prescriptions   Pending Prescriptions Disp Refills     atorvastatin (LIPITOR) 40 MG tablet       Sig: Take 1 tablet (40 mg) by mouth       Statins Protocol Passed - 7/11/2022  4:55 PM        Passed - LDL on file in past 12 months     Recent Labs   Lab Test 10/20/21  0940   LDL 83             Passed - No abnormal creatine kinase in past 12 months     No lab results found.             Passed - Recent (12 mo) or future (30 days) visit within the authorizing provider's specialty     Patient has had an office visit with the authorizing provider or a provider within the authorizing providers department within the previous 12 mos or has a future within next 30 days. See \"Patient Info\" tab in inbasket, or \"Choose Columns\" in Meds & Orders section of the refill encounter.              Passed - Medication is active on med list        Passed - Patient is age 18 or older           tadalafil (CIALIS) 5 MG tablet         Erectile Dysfuction Protocol Passed - 7/11/2022  4:55 PM        Passed - Absence of nitrates on medication list        Passed - Absence of Alpha Blockers on Med list        Passed - Recent (12 mo) or future (30 days) visit within the authorizing provider's specialty     Patient has had an office visit with the authorizing provider or a provider within the authorizing providers department within the previous 12 mos or has a future within next 30 days. See \"Patient Info\" tab in inbasket, or \"Choose Columns\" in Meds & Orders section of the refill encounter.              Passed - Medication is active on med list        Passed - Patient is age 18 or older             Mary Chiu, RN 07/11/22 6:34 PM  "

## 2022-07-13 RX ORDER — ATORVASTATIN CALCIUM 40 MG/1
40 TABLET, FILM COATED ORAL DAILY
Qty: 90 TABLET | Refills: 1 | Status: SHIPPED | OUTPATIENT
Start: 2022-07-13 | End: 2023-01-20

## 2022-07-13 RX ORDER — TADALAFIL 5 MG/1
TABLET ORAL
Qty: 30 TABLET | Refills: 1 | Status: SHIPPED | OUTPATIENT
Start: 2022-07-13 | End: 2022-08-29

## 2022-08-05 DIAGNOSIS — R52 PAIN: ICD-10-CM

## 2022-08-05 RX ORDER — SULINDAC 200 MG/1
200 TABLET ORAL 2 TIMES DAILY
Qty: 180 TABLET | Refills: 0 | Status: SHIPPED | OUTPATIENT
Start: 2022-08-05 | End: 2022-10-31

## 2022-08-06 NOTE — TELEPHONE ENCOUNTER
"Last Written Prescription Date:  4/18/22  Last Fill Quantity: 180,  # refills: 0   Last office visit provider:  10/20/21     Requested Prescriptions   Pending Prescriptions Disp Refills     sulindac (CLINORIL) 200 MG tablet 180 tablet 0     Sig: Take 1 tablet (200 mg) by mouth 2 times daily       NSAID Medications Passed - 8/5/2022 10:18 AM        Passed - Blood pressure under 140/90 in past 12 months     BP Readings from Last 3 Encounters:   10/20/21 112/60   01/18/21 110/58   11/17/20 102/62                 Passed - Normal ALT on file in past 12 months     Recent Labs   Lab Test 10/20/21  0940   ALT 27             Passed - Normal AST on file in past 12 months     Recent Labs   Lab Test 10/20/21  0940   AST 26             Passed - Recent (12 mo) or future (30 days) visit within the authorizing provider's specialty     Patient has had an office visit with the authorizing provider or a provider within the authorizing providers department within the previous 12 mos or has a future within next 30 days. See \"Patient Info\" tab in inbasket, or \"Choose Columns\" in Meds & Orders section of the refill encounter.              Passed - Patient is age 6-64 years        Passed - Normal CBC on file in past 12 months     Recent Labs   Lab Test 10/20/21  0940   WBC 6.5   RBC 4.51   HGB 14.6   HCT 43.8                    Passed - Medication is active on med list        Passed - Normal serum creatinine on file in past 12 months     Recent Labs   Lab Test 10/20/21  0940   CR 1.05       Ok to refill medication if creatinine is low               Mary Chiu RN 08/05/22 8:30 PM  "

## 2022-08-29 DIAGNOSIS — N52.8 OTHER MALE ERECTILE DYSFUNCTION: ICD-10-CM

## 2022-08-29 RX ORDER — TADALAFIL 5 MG/1
TABLET ORAL
Qty: 30 TABLET | Refills: 1 | Status: SHIPPED | OUTPATIENT
Start: 2022-08-29 | End: 2022-09-26

## 2022-08-29 NOTE — TELEPHONE ENCOUNTER
"Last Written Prescription Date:  7/13/22  Last Fill Quantity: 30,  # refills: 1   Last office visit provider:  10/20/21     Requested Prescriptions   Pending Prescriptions Disp Refills     tadalafil (CIALIS) 5 MG tablet 30 tablet 1     Sig: TAKE 1 TABLET(5 MG) BY MOUTH DAILY       Erectile Dysfuction Protocol Passed - 8/29/2022 11:18 AM        Passed - Absence of nitrates on medication list        Passed - Absence of Alpha Blockers on Med list        Passed - Recent (12 mo) or future (30 days) visit within the authorizing provider's specialty     Patient has had an office visit with the authorizing provider or a provider within the authorizing providers department within the previous 12 mos or has a future within next 30 days. See \"Patient Info\" tab in inbasket, or \"Choose Columns\" in Meds & Orders section of the refill encounter.              Passed - Medication is active on med list        Passed - Patient is age 18 or older             Mary Chiu RN 08/29/22 6:49 PM  "

## 2022-09-04 ENCOUNTER — HEALTH MAINTENANCE LETTER (OUTPATIENT)
Age: 62
End: 2022-09-04

## 2022-09-26 DIAGNOSIS — N52.8 OTHER MALE ERECTILE DYSFUNCTION: ICD-10-CM

## 2022-09-26 RX ORDER — TADALAFIL 5 MG/1
TABLET ORAL
Qty: 30 TABLET | Refills: 0 | Status: SHIPPED | OUTPATIENT
Start: 2022-09-26 | End: 2023-03-08

## 2022-09-26 NOTE — TELEPHONE ENCOUNTER
"Last Written Prescription Date:  8/29/22  Last Fill Quantity: 30,  # refills: 1   Last office visit provider:  10/20/21     Requested Prescriptions   Pending Prescriptions Disp Refills     tadalafil (CIALIS) 5 MG tablet [Pharmacy Med Name: TADALAFIL 5MG TABLETS] 90 tablet      Sig: TAKE 1 TABLET(5 MG) BY MOUTH DAILY       Erectile Dysfuction Protocol Passed - 9/26/2022  6:18 AM        Passed - Absence of nitrates on medication list        Passed - Absence of Alpha Blockers on Med list        Passed - Recent (12 mo) or future (30 days) visit within the authorizing provider's specialty     Patient has had an office visit with the authorizing provider or a provider within the authorizing providers department within the previous 12 mos or has a future within next 30 days. See \"Patient Info\" tab in inbasket, or \"Choose Columns\" in Meds & Orders section of the refill encounter.              Passed - Medication is active on med list        Passed - Patient is age 18 or older             Mary Chiu RN 09/26/22 4:27 PM  "

## 2022-10-11 DIAGNOSIS — N52.8 OTHER MALE ERECTILE DYSFUNCTION: ICD-10-CM

## 2022-10-11 RX ORDER — TADALAFIL 5 MG/1
TABLET ORAL
Qty: 90 TABLET | OUTPATIENT
Start: 2022-10-11

## 2022-10-11 NOTE — TELEPHONE ENCOUNTER
Last Written Prescription Date:  9/26/22  Last Fill Quantity: 30,  # refills: 0   Last office visit provider:  10/20/21   Due to be seen  Mary Chiu RN 10/11/22 1:55 PM

## 2022-10-28 DIAGNOSIS — R52 PAIN: ICD-10-CM

## 2022-10-30 NOTE — TELEPHONE ENCOUNTER
"Routing refill request to provider for review/approval because:  Patient needs to be seen because it has been more than 1 year since last office visit.    Last Written Prescription Date:  8/5/22  Last Fill Quantity: 180,  # refills: 0   Last office visit provider:  10/20/21     Requested Prescriptions   Pending Prescriptions Disp Refills     sulindac (CLINORIL) 200 MG tablet [Pharmacy Med Name: SULINDAC 200MG TABLETS] 180 tablet 0     Sig: TAKE 1 TABLET(200 MG) BY MOUTH TWICE DAILY       NSAID Medications Failed - 10/28/2022 12:42 PM        Failed - Blood pressure under 140/90 in past 12 months     BP Readings from Last 3 Encounters:   10/20/21 112/60   01/18/21 110/58   11/17/20 102/62                 Failed - Normal ALT on file in past 12 months     Recent Labs   Lab Test 10/20/21  0940   ALT 27             Failed - Normal AST on file in past 12 months     Recent Labs   Lab Test 10/20/21  0940   AST 26             Failed - Recent (12 mo) or future (30 days) visit within the authorizing provider's specialty     Patient has had an office visit with the authorizing provider or a provider within the authorizing providers department within the previous 12 mos or has a future within next 30 days. See \"Patient Info\" tab in inbasket, or \"Choose Columns\" in Meds & Orders section of the refill encounter.              Failed - Normal CBC on file in past 12 months     Recent Labs   Lab Test 10/20/21  0940   WBC 6.5   RBC 4.51   HGB 14.6   HCT 43.8                    Failed - Normal serum creatinine on file in past 12 months     Recent Labs   Lab Test 10/20/21  0940   CR 1.05       Ok to refill medication if creatinine is low          Passed - Patient is age 6-64 years        Passed - Medication is active on med list             Mary Chiu, RN 10/29/22 8:51 PM  "

## 2022-10-31 RX ORDER — SULINDAC 200 MG/1
TABLET ORAL
Qty: 180 TABLET | Refills: 0 | Status: SHIPPED | OUTPATIENT
Start: 2022-10-31 | End: 2023-03-08

## 2023-01-20 ENCOUNTER — MYC MEDICAL ADVICE (OUTPATIENT)
Dept: INTERNAL MEDICINE | Facility: CLINIC | Age: 63
End: 2023-01-20

## 2023-01-20 DIAGNOSIS — E78.00 HYPERCHOLESTEROLEMIA: ICD-10-CM

## 2023-01-20 RX ORDER — ATORVASTATIN CALCIUM 40 MG/1
40 TABLET, FILM COATED ORAL DAILY
Qty: 90 TABLET | Refills: 1 | Status: SHIPPED | OUTPATIENT
Start: 2023-01-20 | End: 2023-07-12

## 2023-01-20 NOTE — TELEPHONE ENCOUNTER
Routing refill request to provider for review/approval because:  Labs not current:    LDL Cholesterol Calculated   Date Value Ref Range Status   10/20/2021 83 <=129 mg/dL Final     Patient needs to be seen because it has been more than 1 year since last office visit.    Pending Prescriptions:                       Disp   Refills    atorvastatin (LIPITOR) 40 MG tablet        90 tab*1        Sig: Take 1 tablet (40 mg) by mouth daily    Last Written Prescription Date:  7/13/22  Last Fill Quantity: 90,  # refills: 1   Last office visitwith prescribing provider: 10/20/2021   Future Office Visit:  Overdue; none scheduled    Aida Richardson, DHAVALN, RN  [facility]

## 2023-01-21 ENCOUNTER — HEALTH MAINTENANCE LETTER (OUTPATIENT)
Age: 63
End: 2023-01-21

## 2023-01-24 DIAGNOSIS — E03.9 HYPOTHYROIDISM, UNSPECIFIED TYPE: ICD-10-CM

## 2023-01-24 NOTE — TELEPHONE ENCOUNTER
"Routing refill request to provider for review/approval because:  Labs not current:  tsh  Patient needs to be seen because it has been more than 1 year since last office visit.    Last Written Prescription Date:  12/21/21  Last Fill Quantity: 90,  # refills: 3   Last office visit provider:  10/20/21     Requested Prescriptions   Pending Prescriptions Disp Refills     levothyroxine (SYNTHROID/LEVOTHROID) 137 MCG tablet 90 tablet 3     Sig: TAKE 1 TABLET BY MOUTH EVERY DAY AT 6:00 AM  Strength: 137 mcg       Thyroid Protocol Failed - 1/24/2023  7:43 AM        Failed - Recent (12 mo) or future (30 days) visit within the authorizing provider's specialty     Patient has had an office visit with the authorizing provider or a provider within the authorizing providers department within the previous 12 mos or has a future within next 30 days. See \"Patient Info\" tab in inbasket, or \"Choose Columns\" in Meds & Orders section of the refill encounter.              Failed - Normal TSH on file in past 12 months     Recent Labs   Lab Test 10/20/21  0940   TSH 0.95              Passed - Patient is 12 years or older        Passed - Medication is active on med list             Mary Chiu RN 01/24/23 5:26 PM  "

## 2023-01-25 RX ORDER — LEVOTHYROXINE SODIUM 137 UG/1
TABLET ORAL
Qty: 30 TABLET | Refills: 0 | Status: SHIPPED | OUTPATIENT
Start: 2023-01-25 | End: 2023-03-08

## 2023-01-26 ENCOUNTER — TRANSFERRED RECORDS (OUTPATIENT)
Dept: HEALTH INFORMATION MANAGEMENT | Facility: CLINIC | Age: 63
End: 2023-01-26

## 2023-02-13 DIAGNOSIS — I10 ESSENTIAL HYPERTENSION: ICD-10-CM

## 2023-02-13 DIAGNOSIS — K21.00 GASTROESOPHAGEAL REFLUX DISEASE WITH ESOPHAGITIS WITHOUT HEMORRHAGE: ICD-10-CM

## 2023-02-15 RX ORDER — LOSARTAN POTASSIUM AND HYDROCHLOROTHIAZIDE 12.5; 5 MG/1; MG/1
1 TABLET ORAL DAILY
Qty: 90 TABLET | Refills: 0 | Status: SHIPPED | OUTPATIENT
Start: 2023-02-15 | End: 2023-06-11

## 2023-02-15 RX ORDER — OMEPRAZOLE 40 MG/1
CAPSULE, DELAYED RELEASE ORAL
Qty: 90 CAPSULE | Refills: 3 | Status: SHIPPED | OUTPATIENT
Start: 2023-02-15 | End: 2024-03-06

## 2023-02-15 NOTE — TELEPHONE ENCOUNTER
"Routing refill request to provider for review/approval because:  Failed - Recent (12 mo) or future (30 days) visit within the authorizing provider's specialty    Last Written Prescription Date:  2/23/2022  Last Fill Quantity: 90,  # refills: 3   Last office visit provider:  10/20/2021     Requested Prescriptions   Pending Prescriptions Disp Refills     omeprazole (PRILOSEC) 40 MG DR capsule 90 capsule 3     Sig: TAKE 1 CAPSULE BY MOUTH EVERY MORNING BEFORE BREAKFAST       PPI Protocol Failed - 2/15/2023 12:18 PM        Failed - Recent (12 mo) or future (30 days) visit within the authorizing provider's specialty     Patient has had an office visit with the authorizing provider or a provider within the authorizing providers department within the previous 12 mos or has a future within next 30 days. See \"Patient Info\" tab in inbasket, or \"Choose Columns\" in Meds & Orders section of the refill encounter.              Passed - Not on Clopidogrel (unless Pantoprazole ordered)        Passed - No diagnosis of osteoporosis on record        Passed - Medication is active on med list        Passed - Patient is age 18 or older             Edwina Vaughn RN 02/15/23 3:13 PM  "

## 2023-02-15 NOTE — TELEPHONE ENCOUNTER
"Routing refill request to provider for review/approval because:  Patient needs to be seen because it has been more than 1 year since last office visit.    Last Written Prescription Date:  11/25/22  Last Fill Quantity: 90,  # refills: 0   Last office visit provider:  10/20/21     Requested Prescriptions   Pending Prescriptions Disp Refills     losartan-hydrochlorothiazide (HYZAAR) 50-12.5 MG tablet 90 tablet 0     Sig: Take 1 tablet by mouth daily       Angiotensin-II Receptors Failed - 2/13/2023  2:20 PM        Failed - Last blood pressure under 140/90 in past 12 months     BP Readings from Last 3 Encounters:   10/20/21 112/60   01/18/21 110/58   11/17/20 102/62                 Failed - Recent (12 mo) or future (30 days) visit within the authorizing provider's specialty     Patient has had an office visit with the authorizing provider or a provider within the authorizing providers department within the previous 12 mos or has a future within next 30 days. See \"Patient Info\" tab in inbasket, or \"Choose Columns\" in Meds & Orders section of the refill encounter.              Failed - Normal serum creatinine on file in past 12 months     Recent Labs   Lab Test 10/20/21  0940   CR 1.05       Ok to refill medication if creatinine is low          Failed - Normal serum potassium on file in past 12 months     Recent Labs   Lab Test 10/20/21  0940   POTASSIUM 4.7                    Passed - Medication is active on med list        Passed - Patient is age 18 or older       Diuretics (Including Combos) Protocol Failed - 2/13/2023  2:20 PM        Failed - Blood pressure under 140/90 in past 12 months     BP Readings from Last 3 Encounters:   10/20/21 112/60   01/18/21 110/58   11/17/20 102/62                 Failed - Recent (12 mo) or future (30 days) visit within the authorizing provider's specialty     Patient has had an office visit with the authorizing provider or a provider within the authorizing providers department within " "the previous 12 mos or has a future within next 30 days. See \"Patient Info\" tab in inbasket, or \"Choose Columns\" in Meds & Orders section of the refill encounter.              Failed - Normal serum creatinine on file in past 12 months     Recent Labs   Lab Test 10/20/21  0940   CR 1.05              Failed - Normal serum potassium on file in past 12 months     Recent Labs   Lab Test 10/20/21  0940   POTASSIUM 4.7                    Failed - Normal serum sodium on file in past 12 months     Recent Labs   Lab Test 10/20/21  0940                 Passed - Medication is active on med list        Passed - Patient is age 18 or older             Mary Chiu, RN 02/15/23 10:31 AM  "

## 2023-03-07 ASSESSMENT — ENCOUNTER SYMPTOMS
NERVOUS/ANXIOUS: 0
HEADACHES: 0
ARTHRALGIAS: 1
CHILLS: 0
DIARRHEA: 0
MYALGIAS: 1
JOINT SWELLING: 0
SORE THROAT: 1
COUGH: 1
PALPITATIONS: 0
FEVER: 0
WEAKNESS: 0
NAUSEA: 0
PARESTHESIAS: 0
DIZZINESS: 0
EYE PAIN: 0
HEMATURIA: 0
ABDOMINAL PAIN: 0
FREQUENCY: 1
CONSTIPATION: 0
SHORTNESS OF BREATH: 0
HEMATOCHEZIA: 0
HEARTBURN: 1
DYSURIA: 0

## 2023-03-08 ENCOUNTER — OFFICE VISIT (OUTPATIENT)
Dept: INTERNAL MEDICINE | Facility: CLINIC | Age: 63
End: 2023-03-08
Payer: COMMERCIAL

## 2023-03-08 VITALS
RESPIRATION RATE: 16 BRPM | SYSTOLIC BLOOD PRESSURE: 130 MMHG | DIASTOLIC BLOOD PRESSURE: 80 MMHG | HEIGHT: 71 IN | WEIGHT: 214 LBS | BODY MASS INDEX: 29.96 KG/M2 | HEART RATE: 69 BPM | TEMPERATURE: 97.8 F | OXYGEN SATURATION: 96 %

## 2023-03-08 DIAGNOSIS — Z13.21 SCREENING FOR ENDOCRINE, NUTRITIONAL, METABOLIC AND IMMUNITY DISORDER: ICD-10-CM

## 2023-03-08 DIAGNOSIS — Z00.00 ENCOUNTER FOR ANNUAL PHYSICAL EXAM: ICD-10-CM

## 2023-03-08 DIAGNOSIS — N40.1 BENIGN PROSTATIC HYPERPLASIA WITH NOCTURIA: ICD-10-CM

## 2023-03-08 DIAGNOSIS — Z78.9 TAKES DIETARY SUPPLEMENTS: ICD-10-CM

## 2023-03-08 DIAGNOSIS — R09.82 PND (POST-NASAL DRIP): ICD-10-CM

## 2023-03-08 DIAGNOSIS — E66.3 OVERWEIGHT (BMI 25.0-29.9): ICD-10-CM

## 2023-03-08 DIAGNOSIS — Z13.0 SCREENING FOR ENDOCRINE, NUTRITIONAL, METABOLIC AND IMMUNITY DISORDER: ICD-10-CM

## 2023-03-08 DIAGNOSIS — K21.00 GASTROESOPHAGEAL REFLUX DISEASE WITH ESOPHAGITIS WITHOUT HEMORRHAGE: ICD-10-CM

## 2023-03-08 DIAGNOSIS — E78.00 HYPERCHOLESTEROLEMIA: ICD-10-CM

## 2023-03-08 DIAGNOSIS — R35.1 NOCTURIA: ICD-10-CM

## 2023-03-08 DIAGNOSIS — G47.33 OSA (OBSTRUCTIVE SLEEP APNEA): ICD-10-CM

## 2023-03-08 DIAGNOSIS — Z12.5 SCREENING PSA (PROSTATE SPECIFIC ANTIGEN): ICD-10-CM

## 2023-03-08 DIAGNOSIS — I10 ESSENTIAL HYPERTENSION: ICD-10-CM

## 2023-03-08 DIAGNOSIS — E03.9 ACQUIRED HYPOTHYROIDISM: ICD-10-CM

## 2023-03-08 DIAGNOSIS — R73.9 HYPERGLYCEMIA: Primary | ICD-10-CM

## 2023-03-08 DIAGNOSIS — Z13.29 SCREENING FOR ENDOCRINE, NUTRITIONAL, METABOLIC AND IMMUNITY DISORDER: ICD-10-CM

## 2023-03-08 DIAGNOSIS — Z13.228 SCREENING FOR ENDOCRINE, NUTRITIONAL, METABOLIC AND IMMUNITY DISORDER: ICD-10-CM

## 2023-03-08 DIAGNOSIS — R35.1 BENIGN PROSTATIC HYPERPLASIA WITH NOCTURIA: ICD-10-CM

## 2023-03-08 PROBLEM — M48.061 SPINAL STENOSIS OF LUMBAR REGION: Status: ACTIVE | Noted: 2017-03-22

## 2023-03-08 PROBLEM — E34.9 TESTOSTERONE DEFICIENCY: Status: ACTIVE | Noted: 2023-03-08

## 2023-03-08 LAB
ALBUMIN SERPL BCG-MCNC: 4.5 G/DL (ref 3.5–5.2)
ALBUMIN UR-MCNC: NEGATIVE MG/DL
ALP SERPL-CCNC: 91 U/L (ref 40–129)
ALT SERPL W P-5'-P-CCNC: 33 U/L (ref 10–50)
ANION GAP SERPL CALCULATED.3IONS-SCNC: 12 MMOL/L (ref 7–15)
APPEARANCE UR: CLEAR
AST SERPL W P-5'-P-CCNC: 34 U/L (ref 10–50)
BASOPHILS # BLD AUTO: 0 10E3/UL (ref 0–0.2)
BASOPHILS NFR BLD AUTO: 0 %
BILIRUB SERPL-MCNC: 0.5 MG/DL
BILIRUB UR QL STRIP: NEGATIVE
BUN SERPL-MCNC: 23.5 MG/DL (ref 8–23)
CALCIUM SERPL-MCNC: 9.5 MG/DL (ref 8.8–10.2)
CHLORIDE SERPL-SCNC: 105 MMOL/L (ref 98–107)
CHOLEST SERPL-MCNC: 137 MG/DL
COLOR UR AUTO: YELLOW
CREAT SERPL-MCNC: 0.95 MG/DL (ref 0.67–1.17)
DEPRECATED HCO3 PLAS-SCNC: 25 MMOL/L (ref 22–29)
EOSINOPHIL # BLD AUTO: 0.3 10E3/UL (ref 0–0.7)
EOSINOPHIL NFR BLD AUTO: 4 %
ERYTHROCYTE [DISTWIDTH] IN BLOOD BY AUTOMATED COUNT: 12.6 % (ref 10–15)
GFR SERPL CREATININE-BSD FRML MDRD: 89 ML/MIN/1.73M2
GLUCOSE SERPL-MCNC: 120 MG/DL (ref 70–99)
GLUCOSE UR STRIP-MCNC: NEGATIVE MG/DL
HBA1C MFR BLD: 5.9 % (ref 0–5.6)
HCT VFR BLD AUTO: 41.6 % (ref 40–53)
HDLC SERPL-MCNC: 49 MG/DL
HGB BLD-MCNC: 13.5 G/DL (ref 13.3–17.7)
HGB UR QL STRIP: NEGATIVE
IMM GRANULOCYTES # BLD: 0 10E3/UL
IMM GRANULOCYTES NFR BLD: 0 %
KETONES UR STRIP-MCNC: NEGATIVE MG/DL
LDLC SERPL CALC-MCNC: 74 MG/DL
LEUKOCYTE ESTERASE UR QL STRIP: NEGATIVE
LYMPHOCYTES # BLD AUTO: 1.4 10E3/UL (ref 0.8–5.3)
LYMPHOCYTES NFR BLD AUTO: 19 %
MCH RBC QN AUTO: 31.3 PG (ref 26.5–33)
MCHC RBC AUTO-ENTMCNC: 32.5 G/DL (ref 31.5–36.5)
MCV RBC AUTO: 96 FL (ref 78–100)
MONOCYTES # BLD AUTO: 0.7 10E3/UL (ref 0–1.3)
MONOCYTES NFR BLD AUTO: 9 %
NEUTROPHILS # BLD AUTO: 5.1 10E3/UL (ref 1.6–8.3)
NEUTROPHILS NFR BLD AUTO: 67 %
NITRATE UR QL: NEGATIVE
NONHDLC SERPL-MCNC: 88 MG/DL
PH UR STRIP: 5.5 [PH] (ref 5–8)
PLATELET # BLD AUTO: 240 10E3/UL (ref 150–450)
POTASSIUM SERPL-SCNC: 4.4 MMOL/L (ref 3.4–5.3)
PROT SERPL-MCNC: 7 G/DL (ref 6.4–8.3)
PSA SERPL-MCNC: 1.76 NG/ML (ref 0–4.5)
RBC # BLD AUTO: 4.32 10E6/UL (ref 4.4–5.9)
SODIUM SERPL-SCNC: 142 MMOL/L (ref 136–145)
SP GR UR STRIP: 1.02 (ref 1–1.03)
TRIGL SERPL-MCNC: 70 MG/DL
TSH SERPL DL<=0.005 MIU/L-ACNC: 3.98 UIU/ML (ref 0.3–4.2)
UROBILINOGEN UR STRIP-ACNC: 0.2 E.U./DL
WBC # BLD AUTO: 7.6 10E3/UL (ref 4–11)

## 2023-03-08 PROCEDURE — G0103 PSA SCREENING: HCPCS | Performed by: NURSE PRACTITIONER

## 2023-03-08 PROCEDURE — 36415 COLL VENOUS BLD VENIPUNCTURE: CPT | Performed by: NURSE PRACTITIONER

## 2023-03-08 PROCEDURE — 83036 HEMOGLOBIN GLYCOSYLATED A1C: CPT | Performed by: NURSE PRACTITIONER

## 2023-03-08 PROCEDURE — 81003 URINALYSIS AUTO W/O SCOPE: CPT | Performed by: NURSE PRACTITIONER

## 2023-03-08 PROCEDURE — 80061 LIPID PANEL: CPT | Performed by: NURSE PRACTITIONER

## 2023-03-08 PROCEDURE — 99214 OFFICE O/P EST MOD 30 MIN: CPT | Mod: 25 | Performed by: NURSE PRACTITIONER

## 2023-03-08 PROCEDURE — 99396 PREV VISIT EST AGE 40-64: CPT | Performed by: NURSE PRACTITIONER

## 2023-03-08 PROCEDURE — 80050 GENERAL HEALTH PANEL: CPT | Performed by: NURSE PRACTITIONER

## 2023-03-08 RX ORDER — MULTIVITAMIN WITH IRON
1 TABLET ORAL DAILY
Status: CANCELLED | OUTPATIENT
Start: 2023-03-08

## 2023-03-08 RX ORDER — LEVOTHYROXINE SODIUM 137 UG/1
TABLET ORAL
Qty: 90 TABLET | Refills: 3 | Status: SHIPPED | OUTPATIENT
Start: 2023-03-08 | End: 2024-03-04

## 2023-03-08 RX ORDER — TETRACYCLINE HCL 500 MG
CAPSULE ORAL
COMMUNITY
Start: 2022-01-01

## 2023-03-08 RX ORDER — MULTIVITAMIN
1 TABLET ORAL DAILY
COMMUNITY
Start: 2023-03-08

## 2023-03-08 RX ORDER — TAMSULOSIN HYDROCHLORIDE 0.4 MG/1
0.4 CAPSULE ORAL AT BEDTIME
Qty: 90 CAPSULE | Refills: 3 | Status: SHIPPED | OUTPATIENT
Start: 2023-03-08 | End: 2024-03-04

## 2023-03-08 RX ORDER — MAGNESIUM OXIDE 400 MG/1
400 TABLET ORAL DAILY
Status: CANCELLED | COMMUNITY
Start: 2023-03-08

## 2023-03-08 ASSESSMENT — ENCOUNTER SYMPTOMS
HEMATOCHEZIA: 0
FREQUENCY: 1
CHILLS: 0
HEARTBURN: 1
SORE THROAT: 1
DIARRHEA: 0
HEADACHES: 0
WEAKNESS: 0
SHORTNESS OF BREATH: 0
JOINT SWELLING: 0
DIZZINESS: 0
ARTHRALGIAS: 1
PARESTHESIAS: 0
ABDOMINAL PAIN: 0
NAUSEA: 0
CONSTIPATION: 0
COUGH: 1
DYSURIA: 0
MYALGIAS: 1
NERVOUS/ANXIOUS: 0
FEVER: 0
PALPITATIONS: 0
HEMATURIA: 0
EYE PAIN: 0

## 2023-03-08 NOTE — PROGRESS NOTES
SUBJECTIVE:   CC: Raj is an 62 year old who presents for preventative health visit.   Patient has been advised of split billing requirements and indicates understanding: Yes  The patient presents today for his annual exam. He is fasted today.    He reports that he has some tinnitus and decreased hearing, he is concerned that he may have some cerumen in his ears, will check for this today.    He reports that about three months ago he had a cough, and it continues. This is worse when laying down, or getting up in the morning. He will cough 3-4 times, clear his throat, and it will go away. He denies any fever, chills, or shortness of breath.    He also reports that he has lost weight, and has been snoring less. He would like a repeat sleep study done to determine if he still needs to use his CPAP or not. Will refer him for this.    His colonoscopy was last done 10/2014; 2024 follow up is due.    He also reports getting up at night to urinate frequently. He has an enlarged prostate, will start him on Flomax for this, and check a urine today.    He denies other concerns, and has been doing well.     Discussed changing his magnesium supplement from oxide to glycinate as his body can break this down better.     Healthy Habits:     Getting at least 3 servings of Calcium per day:  Yes    Bi-annual eye exam:  Yes    Dental care twice a year:  Yes    Sleep apnea or symptoms of sleep apnea:  Sleep apnea    Diet:  Regular (no restrictions)    Frequency of exercise:  2-3 days/week    Duration of exercise:  15-30 minutes    Taking medications regularly:  Yes    Medication side effects:  Muscle aches    PHQ-2 Total Score: 0    Additional concerns today:  Yes    Today's PHQ-2 Score:   PHQ-2 ( 1999 [employer]) 3/7/2023   Q1: Little interest or pleasure in doing things 0   Q2: Feeling down, depressed or hopeless 0   PHQ-2 Score 0   PHQ-2 Total Score (12-17 Years)- Positive if 3 or more points; Administer PHQ-A if positive -   Q1: Little  interest or pleasure in doing things Not at all   Q2: Feeling down, depressed or hopeless Not at all   PHQ-2 Score 0           Social History     Tobacco Use     Smoking status: Light Smoker     Types: Cigars     Smokeless tobacco: Never     Tobacco comments:     cigar- seldom   Substance Use Topics     Alcohol use: Yes     Comment: Alcoholic Drinks/day: average 2/day         Alcohol Use 3/7/2023   Prescreen: >3 drinks/day or >7 drinks/week? No     Last PSA:   Prostate Specific Antigen Screen   Date Value Ref Range Status   10/20/2021 1.86 0.00 - 4.50 ug/L Final       Reviewed orders with patient. Reviewed health maintenance and updated orders accordingly - Yes  Lab work is in process    Reviewed and updated as needed this visit by clinical staff   Tobacco  Allergies               Reviewed and updated as needed this visit by Provider                 Past Medical History:   Diagnosis Date     Acid reflux      Degenerative arthritis of hip     right     Fracture of lumbar spine (H)      Hyperlipidemia      Hypertension      Hypogonadism male      Hypothyroidism      Osteoarthritis      Peripheral neuropathy, idiopathic      Primary osteoarthritis involving multiple joints 1/18/2021     Sleep apnea     uses CPAP      Past Surgical History:   Procedure Laterality Date     ANKLE SURGERY Right 2013    reconstruction     ARTHROPLASTY KNEE Left      ARTHROPLASTY REVISION KNEE Right 1/20/2021    Procedure: RIGHT REVISION, TOTAL KNEE  ARTHROPLASTY;  Surgeon: Brandon Tadeo MD;  Location: [facility] OR;  Service: Orthopedics     JOINT REPLACEMENT       OTHER SURGICAL HISTORY Left 1975    navicular repair     QUADRICEPS REPAIR Right Dec. 2008     RELEASE CARPAL TUNNEL Bilateral recent     RELEASE CARPAL TUNNEL Left around 2003     TOTAL HIP ARTHROPLASTY Right 7/22/2014    Procedure: RIGHT TOTAL HIP ARTHROPLASTY;  Surgeon: Finesse Hester MD;  Location: [facility] OR;  Service:      [facility] REVISE KNEE JOINT  "REPLACE,ALL PARTS Left 3/15/2016    Procedure: #5  LEFT KNEE TOTAL ARTHROPLASTY REVISION;  Surgeon: Finesse Hester MD;  Location: [facility];  Service: Orthopedics     [facility] TOTAL HIP ARTHROPLASTY      Description: Total Hip Replacement;  Recorded: 08/04/2014;       Review of Systems   Constitutional: Negative for chills and fever.   HENT: Positive for ear pain and sore throat. Negative for congestion and hearing loss.    Eyes: Negative for pain and visual disturbance.   Respiratory: Positive for cough. Negative for shortness of breath.    Cardiovascular: Negative for chest pain, palpitations and peripheral edema.   Gastrointestinal: Positive for heartburn. Negative for abdominal pain, constipation, diarrhea, hematochezia and nausea.   Genitourinary: Positive for frequency and impotence. Negative for dysuria, genital sores, hematuria, penile discharge and urgency.   Musculoskeletal: Positive for arthralgias and myalgias. Negative for joint swelling.   Skin: Negative for rash.   Neurological: Negative for dizziness, weakness, headaches and paresthesias.   Psychiatric/Behavioral: Negative for mood changes. The patient is not nervous/anxious.      OBJECTIVE:   /80 (BP Location: Right arm, Patient Position: Sitting)   Pulse 69   Temp 97.8  F (36.6  C)   Resp 16   Ht 1.816 m (5' 11.5\")   Wt 97.1 kg (214 lb)   SpO2 96%   BMI 29.43 kg/m      Physical Exam  GENERAL: healthy, alert and no distress  EYES: Eyes grossly normal to inspection, conjunctivae and sclerae normal  HENT: Left canal occluded with cerumen, removed via provider with a curette. Recheck of ear canals and TM's normal, nose and mouth without ulcers. Nasal mucosa red, swollen.   NECK: no adenopathy, no asymmetry, masses  RESP: lungs clear to auscultation - no rales, rhonchi or wheezes  CV: regular rate and rhythm, normal S1 S2, no S3 or S4, no murmur, click or rub, no peripheral edema and peripheral pulses strong  ABDOMEN: soft, nontender, " no hepatosplenomegaly, no masses and bowel sounds normal  MS: no gross musculoskeletal defects noted, no edema  SKIN: no suspicious lesions or rashes  NEURO: Normal strength and tone, mentation intact and speech normal  PSYCH: mentation appears normal, affect normal/bright    Diagnostic Test Results: Colonoscopy  Labs reviewed in Epic    ASSESSMENT/PLAN:   Raj was seen today for physical.    Diagnoses and all orders for this visit:    Encounter for annual physical exam: Completed today. Fasted labs drawn. Colonoscopy is due for follow up 10/2024.    Essential hypertension: Blood pressure today in office was 130/80. He continues on Losartan/HCTZ. Stable.   -     Comprehensive metabolic panel (BMP + Alb, Alk Phos, ALT, AST, Total. Bili, TP); Future    Acquired hypothyroidism: He continues on Levothyroxine. Will recheck TSH.  -     levothyroxine (SYNTHROID/LEVOTHROID) 137 MCG tablet; TAKE 1 TABLET BY MOUTH EVERY DAY AT 6:00 AM Strength: 137 mcg  -     TSH WITH FREE T4 REFLEX    Gastroesophageal reflux disease with esophagitis without hemorrhage: He continues on Omeprazole. Rare night reflux. Stable.     Hypercholesterolemia: He continues on Atorvastatin. Stable.   -     Lipid panel reflex to direct LDL Fasting    GAVIN (obstructive sleep apnea): Hx of GAVIN, he has lost weight, he would like a repeat Sleep study, ordered this today.   -     Adult Sleep Eval & Management  Referral; Future    PND (post-nasal drip): Cold 3 months ago, continued cough, and throat phlegm clearing. Nasal mucosa is red, swollen. Will try treating with OTC Flonase and Cetirizine.     Benign prostatic hyperplasia with nocturia/Nocturia: Nocturia, will check a urine today, and start on Flomax. Hx of BPH.   -     tamsulosin (FLOMAX) 0.4 MG capsule; Take 1 capsule (0.4 mg) by mouth At Bedtime  -     UA Macro with Reflex to Micro and Culture - lab collect; Future    Overweight (BMI 25.0-29.9): BMI today was 29.43. Recent weight loss,  discussed continued diet and exercise.     Screening for endocrine, nutritional, metabolic and immunity disorder  -     CBC with platelets and differential    Screening PSA (prostate specific antigen)  -     PSA, screen    Takes dietary supplements: Apple Cider Vinegar and Magnesium.     Other orders  -     REVIEW OF HEALTH MAINTENANCE PROTOCOL ORDERS        Patient has been advised of split billing requirements and indicates understanding: Yes      COUNSELING:   Reviewed preventive health counseling, as reflected in patient instructions  Special attention given to:        Regular exercise       Healthy diet/nutrition       Vision screening       Hearing screening        He reports that he has been smoking cigars. He has never used smokeless tobacco.  Nicotine/Tobacco Cessation Plan:   Information offered: Patient not interested at this time    Julianne Yun CNP  [facility]

## 2023-03-08 NOTE — PATIENT INSTRUCTIONS
For the cough try taking a daily Zyrtec or cetirizine, over-the-counter along with Flonase 1 spray each nostril morning and night.  Try this for the next 2 weeks or so if not helpful please let me know.    Stop the magnesium oxide and start magnesium gluconate per bottle directions.    I have referred you for a repeat sleep study with your weight loss.  Scheduling should reach out to you to help get this scheduled.    Your labs are processing at this time, I released results on [facility] once they are back.    Colonoscopy is due for follow up in 10/2024.    Lets see you back in a year with fasting labs, before then if anything comes up.

## 2023-03-11 DIAGNOSIS — I10 ESSENTIAL HYPERTENSION: ICD-10-CM

## 2023-03-11 RX ORDER — LOSARTAN POTASSIUM AND HYDROCHLOROTHIAZIDE 12.5; 5 MG/1; MG/1
1 TABLET ORAL DAILY
Qty: 90 TABLET | Refills: 0 | OUTPATIENT
Start: 2023-03-11

## 2023-03-31 ENCOUNTER — TRANSFERRED RECORDS (OUTPATIENT)
Dept: HEALTH INFORMATION MANAGEMENT | Facility: CLINIC | Age: 63
End: 2023-03-31

## 2023-04-07 ENCOUNTER — TRANSFERRED RECORDS (OUTPATIENT)
Dept: HEALTH INFORMATION MANAGEMENT | Facility: CLINIC | Age: 63
End: 2023-04-07
Payer: COMMERCIAL

## 2023-04-10 ENCOUNTER — OFFICE VISIT (OUTPATIENT)
Dept: FAMILY MEDICINE | Facility: CLINIC | Age: 63
End: 2023-04-10
Payer: COMMERCIAL

## 2023-04-10 VITALS
HEIGHT: 72 IN | OXYGEN SATURATION: 96 % | DIASTOLIC BLOOD PRESSURE: 68 MMHG | SYSTOLIC BLOOD PRESSURE: 134 MMHG | BODY MASS INDEX: 28.85 KG/M2 | WEIGHT: 213 LBS | HEART RATE: 67 BPM

## 2023-04-10 DIAGNOSIS — Z01.818 PREOP EXAMINATION: Primary | ICD-10-CM

## 2023-04-10 PROBLEM — G56.00 CARPAL TUNNEL SYNDROME: Status: RESOLVED | Noted: 2021-10-20 | Resolved: 2023-04-10

## 2023-04-10 PROBLEM — M15.0 PRIMARY OSTEOARTHRITIS INVOLVING MULTIPLE JOINTS: Status: RESOLVED | Noted: 2021-01-18 | Resolved: 2023-04-10

## 2023-04-10 LAB
ANION GAP SERPL CALCULATED.3IONS-SCNC: 15 MMOL/L (ref 7–15)
BUN SERPL-MCNC: 23.4 MG/DL (ref 8–23)
CALCIUM SERPL-MCNC: 10 MG/DL (ref 8.8–10.2)
CHLORIDE SERPL-SCNC: 104 MMOL/L (ref 98–107)
CREAT SERPL-MCNC: 1.02 MG/DL (ref 0.67–1.17)
DEPRECATED HCO3 PLAS-SCNC: 24 MMOL/L (ref 22–29)
ERYTHROCYTE [DISTWIDTH] IN BLOOD BY AUTOMATED COUNT: 13.2 % (ref 10–15)
GFR SERPL CREATININE-BSD FRML MDRD: 82 ML/MIN/1.73M2
GLUCOSE SERPL-MCNC: 112 MG/DL (ref 70–99)
HCT VFR BLD AUTO: 41.9 % (ref 40–53)
HGB BLD-MCNC: 13.9 G/DL (ref 13.3–17.7)
MCH RBC QN AUTO: 31.3 PG (ref 26.5–33)
MCHC RBC AUTO-ENTMCNC: 33.2 G/DL (ref 31.5–36.5)
MCV RBC AUTO: 94 FL (ref 78–100)
PLATELET # BLD AUTO: 192 10E3/UL (ref 150–450)
POTASSIUM SERPL-SCNC: 4.6 MMOL/L (ref 3.4–5.3)
RBC # BLD AUTO: 4.44 10E6/UL (ref 4.4–5.9)
SODIUM SERPL-SCNC: 143 MMOL/L (ref 136–145)
WBC # BLD AUTO: 8.3 10E3/UL (ref 4–11)

## 2023-04-10 PROCEDURE — 36415 COLL VENOUS BLD VENIPUNCTURE: CPT | Performed by: FAMILY MEDICINE

## 2023-04-10 PROCEDURE — 85027 COMPLETE CBC AUTOMATED: CPT | Performed by: FAMILY MEDICINE

## 2023-04-10 PROCEDURE — 99214 OFFICE O/P EST MOD 30 MIN: CPT | Performed by: FAMILY MEDICINE

## 2023-04-10 PROCEDURE — 80048 BASIC METABOLIC PNL TOTAL CA: CPT | Performed by: FAMILY MEDICINE

## 2023-04-10 RX ORDER — MAGNESIUM OXIDE 400 MG/1
TABLET ORAL
COMMUNITY
Start: 2015-01-01 | End: 2024-06-04

## 2023-04-10 NOTE — LETTER
April 11, 2023      Raj SHAWN An  [address] [place] [address]        Dear ,    We are writing to inform you of your test results.  Kidney tests are essentially normal the slight elevation of urine nitrogen is not concerning or significant.  Blood counts are normal you are not anemic.      Resulted Orders   Basic metabolic panel   Result Value Ref Range    Sodium 143 136 - 145 mmol/L    Potassium 4.6 3.4 - 5.3 mmol/L    Chloride 104 98 - 107 mmol/L    Carbon Dioxide (CO2) 24 22 - 29 mmol/L    Anion Gap 15 7 - 15 mmol/L    Urea Nitrogen 23.4 (H) 8.0 - 23.0 mg/dL    Creatinine 1.02 0.67 - 1.17 mg/dL    Calcium 10.0 8.8 - 10.2 mg/dL    Glucose 112 (H) 70 - 99 mg/dL    GFR Estimate 82 >60 mL/min/1.73m2      Comment:      eGFR calculated using 2021 CKD-EPI equation.   CBC with platelets   Result Value Ref Range    WBC Count 8.3 4.0 - 11.0 10e3/uL    RBC Count 4.44 4.40 - 5.90 10e6/uL    Hemoglobin 13.9 13.3 - 17.7 g/dL    Hematocrit 41.9 40.0 - 53.0 %    MCV 94 78 - 100 fL    MCH 31.3 26.5 - 33.0 pg    MCHC 33.2 31.5 - 36.5 g/dL    RDW 13.2 10.0 - 15.0 %    Platelet Count 192 150 - 450 10e3/uL       If you have any questions or concerns, please call the clinic at the number listed above.       Sincerely,      Adelfo Vincent MD

## 2023-04-10 NOTE — PROGRESS NOTES
[facility]  [address] [address]  Phone: [phone]  Fax: [phone]  Primary Provider: Julianne Yun  Pre-op Performing Provider: MAREN IVORY       PREOPERATIVE EVALUATION:  Today's date: 4/10/2023    Raj Nolan is a 62 year old male who presents for a preoperative evaluation.      4/10/2023     9:38 AM   Additional Questions   Roomed by Faina   Accompanied by self     Surgical Information:  Surgery/Procedure: bilateral gastrocnemius recession, FDL tenotomies  2-4  Surgery Location: [facility] in [place]  Surgeon: Dr. Farah  Surgery Date: 4/12/23  Time of Surgery: TBD  Where patient plans to recover: At home with family  Fax number for surgical facility: fax- [phone]    Assessment & Plan     The proposed surgical procedure is considered LOW risk.    (Z01.818) Preop examination  (primary encounter diagnosis)  Comment: For orthopedic surgery as above.  Plan: Basic metabolic panel, CBC with platelets             PLAN:  1.  CBC and basic metabolic profile reviewed, essentially normal see below.  2.  Patient otherwise is cleared for surgery                 Medication Instructions:  Patient is to take all scheduled medications on the day of surgery    RECOMMENDATION:  APPROVAL GIVEN to proceed with proposed procedure, without further diagnostic evaluation.             Subjective     HPI related to upcoming procedure: Patient comes in for preoperative clearance for the above surgery.  Of note the patient has a predisposition to arthritis issues he has had both knees and hips replaced, he has had ongoing low back pain and some foot pain, and orthopedic surgery has recommended the procedure as above which I do believe is going to help alleviate some of the pain that he is experienced.    Patient has had numerous surgical procedures and surgeries listed, of note there has never been any issue with anesthesia or pain control.    Patient does  have underlying hypertension well controlled no other recent change in his health status.          4/7/2023    11:53 AM   Preop Questions   1. Have you ever had a heart attack or stroke? No   2. Have you ever had surgery on your heart or blood vessels, such as a stent placement, a coronary artery bypass, or surgery on an artery in your head, neck, heart, or legs? No   3. Do you have chest pain with activity? No   4. Do you have a history of  heart failure? No   5. Do you currently have a cold, bronchitis or symptoms of other infection? No   6. Do you have a cough, shortness of breath, or wheezing? No   7. Do you or anyone in your family have previous history of blood clots? No   8. Do you or does anyone in your family have a serious bleeding problem such as prolonged bleeding following surgeries or cuts? No   9. Have you ever had problems with anemia or been told to take iron pills? YES -but not currently   10. Have you had any abnormal blood loss such as black, tarry or bloody stools? No   11. Have you ever had a blood transfusion? No   12. Are you willing to have a blood transfusion if it is medically needed before, during, or after your surgery? Yes   13. Have you or any of your relatives ever had problems with anesthesia? No   14. Do you have sleep apnea, excessive snoring or daytime drowsiness? No   14a. Do you have a CPAP machine? Yes   15. Do you have any artifical heart valves or other implanted medical devices like a pacemaker, defibrillator, or continuous glucose monitor? No   16. Do you have artificial joints? YES -the PSHx   17. Are you allergic to latex? No       Health Care Directive:  Patient does not have a Health Care Directive or Living Will: Advance Directive received and scanned. Click on Code in the patient header to view.    Preoperative Review of :   reviewed - no record of controlled substances prescribed.       Status of Chronic Conditions:  See problem list for active medical  problems.  Problems all longstanding and stable, except as noted/documented.  See ROS for pertinent symptoms related to these conditions.      Review of Systems  CONSTITUTIONAL: NEGATIVE for fever, chills, change in weight  INTEGUMENTARY/SKIN: NEGATIVE for worrisome rashes, moles or lesions  EYES: NEGATIVE for vision changes or irritation  ENT/MOUTH: NEGATIVE for ear, mouth and throat problems  RESP: NEGATIVE for significant cough or SOB  CV: NEGATIVE for chest pain, palpitations or peripheral edema  GI: NEGATIVE for nausea, abdominal pain, heartburn, or change in bowel habits  : NEGATIVE for frequency, dysuria, or hematuria  MUSCULOSKELETAL: NEGATIVE for significant arthralgias or myalgia  NEURO: NEGATIVE for weakness, dizziness or paresthesias  ENDOCRINE: NEGATIVE for temperature intolerance, skin/hair changes  HEME: NEGATIVE for bleeding problems  PSYCHIATRIC: NEGATIVE for changes in mood or affect    Patient Active Problem List    Diagnosis Date Noted     Testosterone deficiency 03/08/2023     Priority: Medium     Benign prostatic hyperplasia with nocturia 03/08/2023     Priority: Medium     Carpal tunnel syndrome 10/20/2021     Priority: Medium     Overweight 10/20/2021     Priority: Medium     Failed total knee arthroplasty, initial encounter (H) 01/20/2021     Priority: Medium     Low back pain 01/18/2021     Priority: Medium     Primary osteoarthritis involving multiple joints 01/18/2021     Priority: Medium     Hypogonadism male 05/07/2020     Priority: Medium     Erectile dysfunction 02/18/2020     Priority: Medium     Obstructive sleep apnea syndrome in adult 02/18/2020     Priority: Medium     Formatting of this note might be different from the original.  uses CPAP  Formatting of this note might be different from the original.  uses CPAP       Peripheral neuropathy, idiopathic 02/18/2020     Priority: Medium     Formatting of this note might be different from the original.  Created by  Conversion    Replacement Utility updated for latest IMO load       Peyronie's disease 02/18/2020     Priority: Medium     Other abnormal glucose 04/23/2018     Priority: Medium     Spinal stenosis of lumbar region 03/22/2017     Priority: Medium     Insomnia 03/07/2016     Priority: Medium     Night sweats 03/07/2016     Priority: Medium     Right knee pain 07/24/2015     Priority: Medium     Aftercare following right knee joint replacement surgery 06/30/2015     Priority: Medium     Knee joint replacement by other means 06/30/2015     Priority: Medium     Abnormality of gait 06/30/2015     Priority: Medium     History of total knee replacement 04/14/2015     Priority: Medium     Other postprocedural status(V45.89) 02/24/2015     Priority: Medium     Hypothyroidism 11/14/2014     Priority: Medium     Formatting of this note might be different from the original.  Replacement Utility updated for latest IMO load       Osteoarthritis of ankle and foot 11/14/2014     Priority: Medium     Formatting of this note might be different from the original.  Replacement Utility updated for latest IMO load       History of total hip replacement 07/22/2014     Priority: Medium     S/p bilateral carpal tunnel release 10/01/2013     Priority: Medium     Arthrodesis status 07/15/2013     Priority: Medium     Pain in right ankle or foot joint 07/15/2013     Priority: Medium     Status post right foot surgery 04/10/2013     Priority: Medium     Rupture of quadriceps tendon 12/31/2008     Priority: Medium      Past Medical History:   Diagnosis Date     Acid reflux      Degenerative arthritis of hip     right     Fracture of lumbar spine (H)      Hyperlipidemia      Hypertension      Hypogonadism male      Hypothyroidism      Osteoarthritis      Peripheral neuropathy, idiopathic      Primary osteoarthritis involving multiple joints 1/18/2021     Sleep apnea     uses CPAP     Past Surgical History:   Procedure Laterality Date     ANKLE  SURGERY Right 2013    reconstruction     ARTHROPLASTY KNEE Left      ARTHROPLASTY REVISION KNEE Right 1/20/2021    Procedure: RIGHT REVISION, TOTAL KNEE  ARTHROPLASTY;  Surgeon: Brandon Tadeo MD;  Location: [facility];  Service: Orthopedics     JOINT REPLACEMENT       OTHER SURGICAL HISTORY Left 1975    navicular repair     QUADRICEPS REPAIR Right Dec. 2008     RELEASE CARPAL TUNNEL Bilateral recent     RELEASE CARPAL TUNNEL Left around 2003     TOTAL HIP ARTHROPLASTY Right 7/22/2014    Procedure: RIGHT TOTAL HIP ARTHROPLASTY;  Surgeon: Finesse Hester MD;  Location: [facility] Main OR;  Service:      [facility] REVISE KNEE JOINT REPLACE,ALL PARTS Left 3/15/2016    Procedure: #5  LEFT KNEE TOTAL ARTHROPLASTY REVISION;  Surgeon: Finesse Hester MD;  Location: [facility];  Service: Orthopedics     [facility] TOTAL HIP ARTHROPLASTY      Description: Total Hip Replacement;  Recorded: 08/04/2014;     Current Outpatient Medications   Medication Sig Dispense Refill     Apple Cider Vinegar 500 MG TABS        atorvastatin (LIPITOR) 40 MG tablet Take 1 tablet (40 mg) by mouth daily 90 tablet 1     Cholecalciferol (VITAMIN D-3) 1000 UNITS CAPS        Coenzyme Q10 (COQ-10) 100 MG CAPS        levothyroxine (SYNTHROID/LEVOTHROID) 137 MCG tablet TAKE 1 TABLET BY MOUTH EVERY DAY AT 6:00 AM Strength: 137 mcg 90 tablet 3     losartan-hydrochlorothiazide (HYZAAR) 50-12.5 MG tablet Take 1 tablet by mouth daily 90 tablet 0     multivitamin (ONE-DAILY) tablet Take 1 tablet by mouth daily       omeprazole (PRILOSEC) 40 MG DR capsule TAKE 1 CAPSULE BY MOUTH EVERY MORNING BEFORE BREAKFAST 90 capsule 3     tamsulosin (FLOMAX) 0.4 MG capsule Take 1 capsule (0.4 mg) by mouth At Bedtime 90 capsule 3       No Known Allergies     Social History     Tobacco Use     Smoking status: Light Smoker     Types: Cigars     Smokeless tobacco: Never     Tobacco comments:     cigar- seldom   Vaping Use     Vaping status: Never Used   Substance  Use Topics     Alcohol use: Yes     Comment: Alcoholic Drinks/day: average 2/day     Family History   Problem Relation Age of Onset     Hypertension Mother      Hyperlipidemia Mother      Dementia Mother      Myelodysplastic syndrome Father      Coronary Artery Disease Brother         Stents     History   Drug Use No         Objective     There were no vitals taken for this visit.    Physical Exam    GENERAL APPEARANCE: healthy, alert and no distress     EYES: EOMI,  PERRL     HENT: ear canals and TM's normal and nose and mouth without ulcers or lesions     NECK: no adenopathy, no asymmetry, masses, or scars and thyroid normal to palpation     RESP: lungs clear to auscultation - no rales, rhonchi or wheezes     CV: regular rates and rhythm, normal S1 S2, no S3 or S4 and no murmur, click or rub     ABDOMEN:  soft, nontender, no HSM or masses and bowel sounds normal     MS: extremities normal- no gross deformities noted, no evidence of inflammation in joints, FROM in all extremities.     SKIN: no suspicious lesions or rashes     NEURO: Normal strength and tone, sensory exam grossly normal, mentation intact and speech normal     PSYCH: mentation appears normal. and affect normal/bright     LYMPHATICS: No cervical adenopathy    Recent Labs   Lab Test 03/08/23  0920 10/20/21  0940   HGB 13.5 14.6    229    139   POTASSIUM 4.4 4.7   CR 0.95 1.05   A1C 5.9*  --         Diagnostics:  Recent Results (from the past 48 hour(s))   Basic metabolic panel    Collection Time: 04/10/23 10:33 AM   Result Value Ref Range    Sodium 143 136 - 145 mmol/L    Potassium 4.6 3.4 - 5.3 mmol/L    Chloride 104 98 - 107 mmol/L    Carbon Dioxide (CO2) 24 22 - 29 mmol/L    Anion Gap 15 7 - 15 mmol/L    Urea Nitrogen 23.4 (H) 8.0 - 23.0 mg/dL    Creatinine 1.02 0.67 - 1.17 mg/dL    Calcium 10.0 8.8 - 10.2 mg/dL    Glucose 112 (H) 70 - 99 mg/dL    GFR Estimate 82 >60 mL/min/1.73m2   CBC with platelets    Collection Time: 04/10/23 10:33  AM   Result Value Ref Range    WBC Count 8.3 4.0 - 11.0 10e3/uL    RBC Count 4.44 4.40 - 5.90 10e6/uL    Hemoglobin 13.9 13.3 - 17.7 g/dL    Hematocrit 41.9 40.0 - 53.0 %    MCV 94 78 - 100 fL    MCH 31.3 26.5 - 33.0 pg    MCHC 33.2 31.5 - 36.5 g/dL    RDW 13.2 10.0 - 15.0 %    Platelet Count 192 150 - 450 10e3/uL      No EKG required for low risk surgery (cataract, skin procedure, breast biopsy, etc).  No EKG required, no history of coronary heart disease, significant arrhythmia, peripheral arterial disease or other structural heart disease.    Revised Cardiac Risk Index (RCRI):  The patient has the following serious cardiovascular risks for perioperative complications:   - No serious cardiac risks = 0 points     RCRI Interpretation: 0 points: Class I (very low risk - 0.4% complication rate)           Signed Electronically by: Adelfo Vincent MD  Copy of this evaluation report is provided to requesting physician.

## 2023-04-12 ENCOUNTER — TRANSFERRED RECORDS (OUTPATIENT)
Dept: HEALTH INFORMATION MANAGEMENT | Facility: CLINIC | Age: 63
End: 2023-04-12
Payer: COMMERCIAL

## 2023-04-27 ENCOUNTER — TRANSFERRED RECORDS (OUTPATIENT)
Dept: HEALTH INFORMATION MANAGEMENT | Facility: CLINIC | Age: 63
End: 2023-04-27
Payer: COMMERCIAL

## 2023-06-11 ENCOUNTER — MYC REFILL (OUTPATIENT)
Dept: FAMILY MEDICINE | Facility: CLINIC | Age: 63
End: 2023-06-11
Payer: COMMERCIAL

## 2023-06-11 DIAGNOSIS — I10 ESSENTIAL HYPERTENSION: ICD-10-CM

## 2023-06-12 RX ORDER — LOSARTAN POTASSIUM AND HYDROCHLOROTHIAZIDE 12.5; 5 MG/1; MG/1
1 TABLET ORAL DAILY
Qty: 90 TABLET | Refills: 3 | Status: SHIPPED | OUTPATIENT
Start: 2023-06-12 | End: 2024-06-11

## 2023-06-12 NOTE — TELEPHONE ENCOUNTER
"Last Written Prescription Date:  2/15/23  Last Fill Quantity: 90,  # refills: 0   Last office visit provider:  4/10/23     Requested Prescriptions   Pending Prescriptions Disp Refills     losartan-hydrochlorothiazide (HYZAAR) 50-12.5 MG tablet 90 tablet 0     Sig: Take 1 tablet by mouth daily       Angiotensin-II Receptors Passed - 6/12/2023 12:00 PM        Passed - Last blood pressure under 140/90 in past 12 months     BP Readings from Last 3 Encounters:   04/10/23 134/68   03/08/23 130/80   10/20/21 112/60                 Passed - Recent (12 mo) or future (30 days) visit within the authorizing provider's specialty     Patient has had an office visit with the authorizing provider or a provider within the authorizing providers department within the previous 12 mos or has a future within next 30 days. See \"Patient Info\" tab in inbasket, or \"Choose Columns\" in Meds & Orders section of the refill encounter.              Passed - Medication is active on med list        Passed - Patient is age 18 or older        Passed - Normal serum creatinine on file in past 12 months     Recent Labs   Lab Test 04/10/23  1033   CR 1.02       Ok to refill medication if creatinine is low          Passed - Normal serum potassium on file in past 12 months     Recent Labs   Lab Test 04/10/23  1033   POTASSIUM 4.6                   Diuretics (Including Combos) Protocol Passed - 6/12/2023 12:00 PM        Passed - Blood pressure under 140/90 in past 12 months     BP Readings from Last 3 Encounters:   04/10/23 134/68   03/08/23 130/80   10/20/21 112/60                 Passed - Recent (12 mo) or future (30 days) visit within the authorizing provider's specialty     Patient has had an office visit with the authorizing provider or a provider within the authorizing providers department within the previous 12 mos or has a future within next 30 days. See \"Patient Info\" tab in inbasket, or \"Choose Columns\" in Meds & Orders section of the refill " encounter.              Passed - Medication is active on med list        Passed - Patient is age 18 or older        Passed - Normal serum creatinine on file in past 12 months     Recent Labs   Lab Test 04/10/23  1033   CR 1.02              Passed - Normal serum potassium on file in past 12 months     Recent Labs   Lab Test 04/10/23  1033   POTASSIUM 4.6                    Passed - Normal serum sodium on file in past 12 months     Recent Labs   Lab Test 04/10/23  1033                      Florencio Sharp RN 06/12/23 12:01 PM

## 2023-07-11 ENCOUNTER — OFFICE VISIT (OUTPATIENT)
Dept: SLEEP MEDICINE | Facility: CLINIC | Age: 63
End: 2023-07-11
Attending: NURSE PRACTITIONER
Payer: COMMERCIAL

## 2023-07-11 VITALS
SYSTOLIC BLOOD PRESSURE: 136 MMHG | HEIGHT: 72 IN | BODY MASS INDEX: 28.86 KG/M2 | OXYGEN SATURATION: 94 % | DIASTOLIC BLOOD PRESSURE: 82 MMHG | WEIGHT: 213.06 LBS | HEART RATE: 94 BPM

## 2023-07-11 DIAGNOSIS — G47.33 OSA (OBSTRUCTIVE SLEEP APNEA): Primary | ICD-10-CM

## 2023-07-11 PROCEDURE — 99205 OFFICE O/P NEW HI 60 MIN: CPT | Performed by: NURSE PRACTITIONER

## 2023-07-11 ASSESSMENT — SLEEP AND FATIGUE QUESTIONNAIRES
HOW LIKELY ARE YOU TO NOD OFF OR FALL ASLEEP WHILE SITTING AND READING: SLIGHT CHANCE OF DOZING
HOW LIKELY ARE YOU TO NOD OFF OR FALL ASLEEP WHILE SITTING INACTIVE IN A PUBLIC PLACE: WOULD NEVER DOZE
HOW LIKELY ARE YOU TO NOD OFF OR FALL ASLEEP WHILE WATCHING TV: SLIGHT CHANCE OF DOZING
HOW LIKELY ARE YOU TO NOD OFF OR FALL ASLEEP WHILE LYING DOWN TO REST IN THE AFTERNOON WHEN CIRCUMSTANCES PERMIT: SLIGHT CHANCE OF DOZING
HOW LIKELY ARE YOU TO NOD OFF OR FALL ASLEEP IN A CAR, WHILE STOPPED FOR A FEW MINUTES IN TRAFFIC: WOULD NEVER DOZE
HOW LIKELY ARE YOU TO NOD OFF OR FALL ASLEEP WHILE SITTING QUIETLY AFTER LUNCH WITHOUT ALCOHOL: WOULD NEVER DOZE
HOW LIKELY ARE YOU TO NOD OFF OR FALL ASLEEP WHILE SITTING AND TALKING TO SOMEONE: WOULD NEVER DOZE
HOW LIKELY ARE YOU TO NOD OFF OR FALL ASLEEP WHEN YOU ARE A PASSENGER IN A CAR FOR AN HOUR WITHOUT A BREAK: SLIGHT CHANCE OF DOZING

## 2023-07-11 NOTE — NURSING NOTE
Chief Complaint   Patient presents with     Consult       Initial /82   Pulse 94   Ht 1.829 m (6')   Wt 96.6 kg (213 lb 1 oz)   SpO2 94%   BMI 28.90 kg/m   Estimated body mass index is 28.9 kg/m  as calculated from the following:    Height as of this encounter: 1.829 m (6').    Weight as of this encounter: 96.6 kg (213 lb 1 oz).    Medication Reconciliation: complete    Neck circumference: 16.5 inches / 42 centimeters.    DME:     [facility]

## 2023-07-11 NOTE — NURSING NOTE
Home sleep test and return visit has been scheduled. AVS and HST  instruction handouts given to patient.     [place] [facility]

## 2023-07-11 NOTE — PROGRESS NOTES
Outpatient Sleep Medicine Consultation:      Name: Raj Nolan MRN# [id number]   Age: 62 year old YOB: 1960     Date of Consultation: July 11, 2023  Consultation is requested by: Julianne Yun, CNP  [address] [place] [place] [address] Julianne Yun  Primary care provider: Julianne Yun       Reason for Sleep Consult:     Raj Nolan is sent by Julianne Yun for a sleep consultation regarding patient has known history of obstructive sleep apnea, has lost weight and would like to know if he still requires CPAP usage.    Patient s Reason for visit  Raj Nolan main reason for visit: GP recommendation  Patient states problem(s) started: 2005  Raj Nolan's goals for this visit: learn if I still need a CPAP           Assessment and Plan:     Summary Sleep Diagnoses:  Obstructive sleep apnea, untreated  Insomnia  Nocturnal GERD  Multiple nocturnal awakenings  Witnessed episodes of apnea    Comorbid Diagnoses:  Low back pain  Overweight  Peripheral neuropathy, idiopathic  Hypothyroidism  Testosterone deficiency  Erectile dysfunction  Anal stenosis of the lumbar region    Summary Recommendations:  Orders Placed This Encounter   Procedures    Sleep Study - HIM Scan     1.  Recommend patient undergo sleep testing.  Patient STOP-BANG score is 5/8 indicating an increased pretest probability for obstructive sleep apnea.  HST ordered.  2.  Recommend patient return to clinic approximately 2 weeks after sleep study has been completed.  At that time we will review the results as well as to determine plan of care going forward.  3.  Recommend patient optimize sleep schedule as well as his sleep hygiene practices.  This will mitigate any future sleep intrusion.  4.  Recommend patient employ safe driving practices such as not driving a car if drowsy.  5.  Weight management to BMI of 30    Summary Counseling:    Sleep Testing Reviewed: HST  Obstructive Sleep Apnea  Reviewed   -Discussed pathophysiology of obstructive sleep apnea as well as central sleep apnea   -Discussed all methods of treatment of sleep apnea including PAP therapy, mandibular advancement device, surgical options  Complications of Untreated Sleep Apnea Reviewed in the context of his medical diagnoses    Medical Decision-making:   Educational materials provided in instructions    Total time spent reviewing medical records, history and physical examination, review of previous testing and interpretation as well as documentation on this date: 60 minutes    CC: Julianne Yun          History of Present Illness:     Raj Nolan is a pleasant 62-year-old gentleman with a known history of obstructive sleep apnea with an AHI of 26.2 when tested in 2009.  He stopped using his CPAP over COVID, and has lost weight since then.  He presents today and request to be retested to see if he still requires CPAP treatment.  Of note, patient has lost 28 pounds since he was tested in 2009.    Patient does admit to 2-3 awakenings each night for either pain or elimination purposes.  He also admits that he still does have witnessed episodes of apnea, nocturnal GERD symptoms.    Plans for Raj include a home sleep test followed by a return visit to the sleep clinic approximately 2 weeks after his sleep study has been completed.  At that point we will review his results as well as define his plan of care going forward.      Past Sleep Evaluations:  Patient underwent of polysomnographic sleep study on 11/2/2009, at [facility]    Patient data: Height 73 inches, weight: 241 pounds, BMI: 31.8    AHI: 26.2 events/hour  RDI: 37.1 events/hour  SaO2, mean: 92%  SaO2, jhonatan: 83%    PLM index: 118.7  SLEEP-WAKE SCHEDULE:     Work/School Days: Patient goes to school/work: No   Usually gets into bed at    Takes patient about 15 minutes to fall asleep  Has trouble falling asleep 0 nights per week  Wakes up in the middle of the night 2 to  3 times.  Wakes up due to Pain;Use the bathroom  He has trouble falling back asleep none times a week.   It usually takes 5 minutes to get back to sleep  Patient is usually up at 7am  Uses alarm: No    Weekends/Non-work Days/All Other Days:  Usually gets into bed at 11pm   Takes patient about 15 minutes to fall asleep  Patient is usually up at 8am  Uses alarm: No    Sleep Need  Patient gets  7 hours sleep on average   Patient thinks he needs about 8 hours sleep    Raj Nolan prefers to sleep in this position(s): Side   Patient states they do the following activities in bed: Watch TV;Use phone, computer, or tablet    Naps  Patient takes a purposeful nap 1 times a week and naps are usually 1hour in duration  He feels better after a nap: Yes  He dozes off unintentionally 1 to 2 days per week  Patient has had a driving accident or near-miss due to sleepiness/drowsiness: No      SLEEP DISRUPTIONS:    Breathing/Snoring  Patient snores:No  Other people complain about his snoring: No  Patient has been told he stops breathing in his sleep:Yes  He has issues with the following: Heartburn or reflux at night;Getting up to urinate more than once    Movement:  Patient gets pain, discomfort, with an urge to move:  No  It happens when he is resting:  No  It happens more at night:  No  Patient has been told he kicks his legs at night:  No     Behaviors in Sleep:  Raj Nolan has experienced the following behaviors while sleeping:    He has experienced sudden muscle weakness during the day: No      Is there anything else you would like your sleep provider to know: started with CPAP in 2009. stopped during COVID. My GP suggested I get recheched for apnea.        CAFFEINE AND OTHER SUBSTANCES:    Patient consumes caffeinated beverages per day:  two  Last caffeine use is usually: 10am  List of any prescribed or over the counter stimulants that patient takes: none  List of any prescribed or over the counter sleep  medication patient takes: none  List of previous sleep medications that patient has tried: none  Patient drinks alcohol to help them sleep: No  Patient drinks alcohol near bedtime: No    Family History:  Patient has a family member been diagnosed with a sleep disorder: No            SCALES:    EPWORTH SLEEPINESS SCALE         7/11/2023     8:05 AM    [place] Sleepiness Scale ( KANDACE Ray  1990-1997[facility] [place]/English - Final version - 21 Nov 07 - [facility] [place].)   Sitting and reading Slight chance of dozing   Watching TV Slight chance of dozing   Sitting, inactive in a public place (e.g. a theatre or a meeting) Would never doze   As a passenger in a car for an hour without a break Slight chance of dozing   Lying down to rest in the afternoon when circumstances permit Slight chance of dozing   Sitting and talking to someone Would never doze   Sitting quietly after a lunch without alcohol Would never doze   In a car, while stopped for a few minutes in traffic Would never doze   [place] Score (MC) 4   [place] Score (Sleep) 4         INSOMNIA SEVERITY INDEX (LIZA)          7/11/2023     7:35 AM   Insomnia Severity Index (LIZA)   Difficulty falling asleep 0   Difficulty staying asleep 1   Problems waking up too early 1   How SATISFIED/DISSATISFIED are you with your CURRENT sleep pattern? 1   How NOTICEABLE to others do you think your sleep problem is in terms of impairing the quality of your life? 0   How WORRIED/DISTRESSED are you about your current sleep problem? 1   To what extent do you consider your sleep problem to INTERFERE with your daily functioning (e.g. daytime fatigue, mood, ability to function at work/daily chores, concentration, memory, mood, etc.) CURRENTLY? 0   LIZA Total Score 4       Guidelines for Scoring/Interpretation:  Total score categories:  0-7 = No clinically significant insomnia   8-14 = Subthreshold insomnia   15-21 = Clinical insomnia (moderate severity)  22-28 = Clinical insomnia  "(severe)  Used via courtesy of www.myhealth.va.gov with permission from Aakash Correia PhD., [facility]      STOP BANG         7/11/2023     8:07 AM   STOP BANG Questionnaire (  2008, the American Society of Anesthesiologists, Inc. [employer]   1. Snoring - Do you snore loudly (louder than talking or loud enough to be heard through closed doors)? Yes   2. Tired - Do you often feel tired, fatigued, or sleepy during daytime? No   3. Observed - Has anyone observed you stop breathing during your sleep? No   4. Blood pressure - Do you have or are you being treated for high blood pressure? Yes   5. BMI - BMI more than 35 kg/m2? No   6. Age - Age over 50 yr old? Yes   7. Neck circumference - Neck circumference greater than 40 cm? Yes   8. Gender - Gender male? Yes   STOP BANG Score (MC): 4 (High risk of GAVIN)         GAD7         No data to display                  CAGE-AID         No data to display                CAGE-AID reprinted with permission from the [place] Medical Journal, EUNICE Adams and ANNY Lu, \"Conjoint screening questionnaires for alcohol and drug abuse\" [place] Medical Journal 94: 135-140, 1995.      PATIENT HEALTH QUESTIONNAIRE-9 (PHQ - 9)        11/17/2020    10:55 AM   PHQ-9 ([employer])   1.  Little interest or pleasure in doing things 0   2.  Feeling down, depressed, or hopeless 0       Developed by Kailash Jc, Mai Campbell, Ambrocio Mar and colleagues, with an educational titi from [employer]. No permission required to reproduce, translate, display or distribute.        Allergies:    No Known Allergies    Medications:    Current Outpatient Medications   Medication Sig Dispense Refill    Apple Cider Vinegar 500 MG TABS       atorvastatin (LIPITOR) 40 MG tablet Take 1 tablet (40 mg) by mouth daily 90 tablet 1    Cholecalciferol (VITAMIN D-3) 1000 UNITS CAPS       Coenzyme Q10 (COQ-10) 100 MG CAPS       Lactobacillus (PROBIOTIC ACIDOPHILUS) TABS    "    levothyroxine (SYNTHROID/LEVOTHROID) 137 MCG tablet TAKE 1 TABLET BY MOUTH EVERY DAY AT 6:00 AM Strength: 137 mcg 90 tablet 3    losartan-hydrochlorothiazide (HYZAAR) 50-12.5 MG tablet Take 1 tablet by mouth daily 90 tablet 3    magnesium oxide (MAG-OX) 400 MG tablet       multivitamin (ONE-DAILY) tablet Take 1 tablet by mouth daily      omeprazole (PRILOSEC) 40 MG DR capsule TAKE 1 CAPSULE BY MOUTH EVERY MORNING BEFORE BREAKFAST 90 capsule 3    tamsulosin (FLOMAX) 0.4 MG capsule Take 1 capsule (0.4 mg) by mouth At Bedtime 90 capsule 3       Problem List:  Patient Active Problem List    Diagnosis Date Noted    Testosterone deficiency 03/08/2023     Priority: Medium    Benign prostatic hyperplasia with nocturia 03/08/2023     Priority: Medium    Overweight 10/20/2021     Priority: Medium    Failed total knee arthroplasty, initial encounter (H) 01/20/2021     Priority: Medium    Low back pain 01/18/2021     Priority: Medium    Hypogonadism male 05/07/2020     Priority: Medium    Erectile dysfunction 02/18/2020     Priority: Medium    Obstructive sleep apnea syndrome in adult 02/18/2020     Priority: Medium     Formatting of this note might be different from the original.  uses CPAP  Formatting of this note might be different from the original.  uses CPAP      Peripheral neuropathy, idiopathic 02/18/2020     Priority: Medium     Formatting of this note might be different from the original.  Created by Conversion    Replacement Utility updated for latest IMO load      Peyronie's disease 02/18/2020     Priority: Medium    Other abnormal glucose 04/23/2018     Priority: Medium    Spinal stenosis of lumbar region 03/22/2017     Priority: Medium    Insomnia 03/07/2016     Priority: Medium    History of total knee replacement 04/14/2015     Priority: Medium    Other postprocedural status(V45.89) 02/24/2015     Priority: Medium    Hypothyroidism 11/14/2014     Priority: Medium     Formatting of this note might be different  from the original.  Replacement Utility updated for latest IMO load      Osteoarthritis of ankle and foot 11/14/2014     Priority: Medium     Formatting of this note might be different from the original.  Replacement Utility updated for latest IMO load      S/p bilateral carpal tunnel release 10/01/2013     Priority: Medium    Rupture of quadriceps tendon 12/31/2008     Priority: Medium        Past Medical/Surgical History:  Past Medical History:   Diagnosis Date    Degenerative arthritis of hip     right    Fracture of lumbar spine (H)     Peripheral neuropathy, idiopathic     Primary osteoarthritis involving multiple joints 01/18/2021    Sleep apnea     uses CPAP     Past Surgical History:   Procedure Laterality Date    ANKLE SURGERY Right 01/01/2013    reconstruction    ARTHROPLASTY KNEE Left     ARTHROPLASTY REVISION KNEE Right 01/20/2021    Procedure: RIGHT REVISION, TOTAL KNEE  ARTHROPLASTY;  Surgeon: Brandon Tadeo MD;  Location: [facility];  Service: Orthopedics    OTHER SURGICAL HISTORY Left 01/01/1975    navicular repair    QUADRICEPS REPAIR Right 12/01/2008    RELEASE CARPAL TUNNEL Bilateral recent    RELEASE CARPAL TUNNEL Left around 2003    REPLACEMENT TOTAL KNEE Bilateral     TOTAL HIP ARTHROPLASTY Right 07/22/2014    Procedure: RIGHT TOTAL HIP ARTHROPLASTY;  Surgeon: Finesse Hester MD;  Location: [facility] Main OR;  Service:     [facility] REVISE KNEE JOINT REPLACE,ALL PARTS Left 03/15/2016    Procedure: #5  LEFT KNEE TOTAL ARTHROPLASTY REVISION;  Surgeon: Finesse Hester MD;  Location: [facility];  Service: Orthopedics    [facility] TOTAL HIP ARTHROPLASTY Bilateral     Description: Total Hip Replacement;  Recorded: 08/04/2014;       Social History:  Social History     Socioeconomic History    Marital status:      Spouse name: Not on file    Number of children: Not on file    Years of education: Not on file    Highest education level: Not on file   Occupational History    Not on  file   Tobacco Use    Smoking status: Light Smoker     Types: Cigars    Smokeless tobacco: Never    Tobacco comments:     cigar- seldom   Vaping Use    Vaping Use: Never used   Substance and Sexual Activity    Alcohol use: Yes     Alcohol/week: 7.0 standard drinks of alcohol     Types: 7 Standard drinks or equivalent per week     Comment: Alcoholic Drinks/day:    Drug use: No    Sexual activity: Yes     Partners: Female   Other Topics Concern    Parent/sibling w/ CABG, MI or angioplasty before 65F 55M? Not Asked   Social History Narrative    Not on file     Social Determinants of Health     Financial Resource Strain: Not on file   Food Insecurity: Not on file   Transportation Needs: Not on file   Physical Activity: Not on file   Stress: Not on file   Social Connections: Not on file   Intimate Partner Violence: Not on file   Housing Stability: Not on file       Family History:  Family History   Problem Relation Age of Onset    Hypertension Mother     Hyperlipidemia Mother     Dementia Mother     Myelodysplastic syndrome Father     Coronary Artery Disease Brother         Stents       Review of Systems:  A complete review of systems reviewed by me is negative with the exeption of what has been mentioned in the history of present illness.        Physical Examination:  Vitals: /82   Pulse 94   Ht 1.829 m (6')   Wt 96.6 kg (213 lb 1 oz)   SpO2 94%   BMI 28.90 kg/m    BMI= Body mass index is 28.9 kg/m .    Neck Cir (cm): 42 cm (16.5)      Physical Exam  Vitals and nursing note reviewed.   Constitutional:       General: He is not in acute distress.     Appearance: Normal appearance. He is overweight. He is not ill-appearing or toxic-appearing.   HENT:      Head: Normocephalic and atraumatic.      Right Ear: External ear normal.      Left Ear: External ear normal.      Nose: Nose normal.      Mouth/Throat:      Mouth: Mucous membranes are moist.      Pharynx: Oropharynx is clear.   Eyes:      Conjunctiva/sclera:  Conjunctivae normal.   Cardiovascular:      Rate and Rhythm: Normal rate and regular rhythm.      Pulses: Normal pulses.      Heart sounds: Normal heart sounds.   Pulmonary:      Effort: Pulmonary effort is normal.      Breath sounds: Normal breath sounds.   Musculoskeletal:         General: Normal range of motion.      Cervical back: Normal range of motion and neck supple.   Skin:     General: Skin is warm and dry.      Capillary Refill: Capillary refill takes less than 2 seconds.   Neurological:      General: No focal deficit present.      Mental Status: He is alert and oriented to person, place, and time.   Psychiatric:         Mood and Affect: Mood normal.         Behavior: Behavior normal.         Thought Content: Thought content normal.         Judgment: Judgment normal.                Data: All pertinent previous laboratory data reviewed     Recent Labs   Lab Test 04/10/23  1033 03/08/23  0920    142   POTASSIUM 4.6 4.4   CHLORIDE 104 105   CO2 24 25   ANIONGAP 15 12   * 120*   BUN 23.4* 23.5*   CR 1.02 0.95   LATONIA 10.0 9.5       Recent Labs   Lab Test 04/10/23  1033   WBC 8.3   RBC 4.44   HGB 13.9   HCT 41.9   MCV 94   MCH 31.3   MCHC 33.2   RDW 13.2          Recent Labs   Lab Test 03/08/23  0920   PROTTOTAL 7.0   ALBUMIN 4.5   BILITOTAL 0.5   ALKPHOS 91   AST 34   ALT 33       TSH   Date Value   03/08/2023 3.98 uIU/mL   10/20/2021 0.95 uIU/mL   04/19/2017 0.21 mU/L (L)       No results found for: UAMP, UBARB, BENZODIAZEUR, UCANN, UCOC, OPIT, UPCP    No results found for: IRONSAT, [id number], PENELOPE    No results found for: PH, PHARTERIAL, PO2, [id number], SAT, PCO2, HCO3, BASEEXCESS, ALAN, BEB    @LABRCNTIPR(phv:4,pco2v:4,po2v:4,hco3v:4,joao:4,o2per:4)@    Echocardiology: No results found for this or any previous visit (from the past 4320 hour(s)).    Chest x-ray: No results found for this or any previous visit from the past 365 days.      Chest CT: No results found for this or any previous  visit from the past 365 days.      PFT: Most Recent [facility] Pulmonary Function Testing    No results found for: 20001  No results found for: 20002  No results found for: 20003  No results found for: 20015  No results found for: 20016  No results found for: 20027  No results found for: 20028  No results found for: 20029  No results found for: 20079  No results found for: 20080  No results found for: 20081  No results found for: 20335  No results found for: 20105  No results found for: 20053  No results found for: 20054  No results found for: 20055      REVA Posada CNP 7/11/2023   Sleep Medicine    This note was written with the assistance of the Dragon voice-dictation technology software. The final document, although reviewed, may contain errors. For corrections, please contact the office.

## 2023-07-12 DIAGNOSIS — E78.00 HYPERCHOLESTEROLEMIA: ICD-10-CM

## 2023-07-12 RX ORDER — ATORVASTATIN CALCIUM 40 MG/1
40 TABLET, FILM COATED ORAL DAILY
Qty: 90 TABLET | Refills: 1 | Status: SHIPPED | OUTPATIENT
Start: 2023-07-12 | End: 2023-12-05

## 2023-07-28 NOTE — PATIENT INSTRUCTIONS
Drive Safe... Drive Alive     Sleep health profoundly affects your health, mood, and your safety. 33% of the population (one in three of us) is not getting enough sleep and many have a sleep disorder. Not getting enough sleep or having an untreated / undertreated sleep condition may make us sleepy without even knowing it. In fact, our driving could be dramatically impaired due to our sleep health. As your provider, here are some things I would like you to know about driving:     Here are some warning signs for impairment and dangerous drowsy driving:              -Having been awake more than 16 hours               -Looking tired               -Eyelid drooping              -Head nodding (it could be too late at this point)              -Driving for more than 30 minutes     Some things you could do to make the driving safer if you are experiencing some drowsiness:              -Stop driving and rest              -Call for transportation              -Make sure your sleep disorder is adequately treated     Some things that have been shown NOT to work when experiencing drowsiness while driving:              -Turning on the radio              -Opening windows              -Eating any  distracting  /  entertaining  foods (e.g., sunflower seeds, candy, or any other)              -Talking on the phone      Your decision may not only impact your life, but also the life of others. Please, remember to drive safe for yourself and all of us. Your BMI is Body mass index is 28.9 kg/m .    What is BMI?  Body mass index (BMI) is one way to tell whether you are at a healthy weight, overweight, or obese. It measures your weight in relation to your height.  A BMI of 18.5 to 24.9 is in the healthy range. A person with a BMI of 25 to 29.9 is considered overweight, and someone with a BMI of 30 or greater is considered obese.  Another way to find out if you are at risk for health problems caused by overweight and obesity is to measure your  waist. If you are a woman and your waist is more than 35 inches, or if you are a man and your waist is more than 40 inches, your risk of disease may be higher.  More than two-thirds of American adults are considered overweight or obese. Being overweight or obese increases the risk for further weight gain.  Excess weight may lead to heart disease and diabetes. Creating and following plans for healthy eating and physical activity may help you improve your health.    Methods for maintaining or losing weight.  Weight control is part of healthy lifestyle and includes exercise, emotional health, and healthy eating habits.  Careful eating habits lifelong is the mainstay of weight control.  Though there are significant health benefits from weight loss, long-term weight loss with diet alone may be very difficult to achieve- studies show long-term success with dietary management in less than 10% of people. Attaining a healthy weight may be especially difficult to achieve in those with severe obesity. In some cases, medications, devices and surgical management might be considered.    What can you do?  If you are overweight or obese and are interested in methods for weight loss, you should discuss this with your provider. In addition, we recommend that you review healthy life styles and methods for weight loss available through the National Institutes of Health patient information sites:   http://win.niddk.nih.gov/publications/index.htm

## 2023-08-07 ASSESSMENT — SLEEP AND FATIGUE QUESTIONNAIRES
HOW LIKELY ARE YOU TO NOD OFF OR FALL ASLEEP IN A CAR, WHILE STOPPED FOR A FEW MINUTES IN TRAFFIC: WOULD NEVER DOZE
HOW LIKELY ARE YOU TO NOD OFF OR FALL ASLEEP WHILE WATCHING TV: SLIGHT CHANCE OF DOZING
HOW LIKELY ARE YOU TO NOD OFF OR FALL ASLEEP WHILE LYING DOWN TO REST IN THE AFTERNOON WHEN CIRCUMSTANCES PERMIT: MODERATE CHANCE OF DOZING
HOW LIKELY ARE YOU TO NOD OFF OR FALL ASLEEP WHILE SITTING QUIETLY AFTER LUNCH WITHOUT ALCOHOL: WOULD NEVER DOZE
HOW LIKELY ARE YOU TO NOD OFF OR FALL ASLEEP WHILE SITTING INACTIVE IN A PUBLIC PLACE: WOULD NEVER DOZE
HOW LIKELY ARE YOU TO NOD OFF OR FALL ASLEEP WHEN YOU ARE A PASSENGER IN A CAR FOR AN HOUR WITHOUT A BREAK: SLIGHT CHANCE OF DOZING
HOW LIKELY ARE YOU TO NOD OFF OR FALL ASLEEP WHILE SITTING AND TALKING TO SOMEONE: WOULD NEVER DOZE
HOW LIKELY ARE YOU TO NOD OFF OR FALL ASLEEP WHILE SITTING AND READING: SLIGHT CHANCE OF DOZING

## 2023-08-08 ENCOUNTER — OFFICE VISIT (OUTPATIENT)
Dept: SLEEP MEDICINE | Facility: CLINIC | Age: 63
End: 2023-08-08
Payer: COMMERCIAL

## 2023-08-08 DIAGNOSIS — G47.33 OSA (OBSTRUCTIVE SLEEP APNEA): ICD-10-CM

## 2023-08-08 PROCEDURE — G0399 HOME SLEEP TEST/TYPE 3 PORTA: HCPCS | Performed by: INTERNAL MEDICINE

## 2023-08-08 NOTE — PROGRESS NOTES
Pt is completing a home sleep test. Pt was instructed on how to put on the Noxturnal T3 device and associated equipment before going to bed and given the opportunity to practice putting it on before leaving the sleep center. Pt was reminded to bring the home sleep test kit back to the center tomorrow, at agreed upon time for download and reporting.   Neck circumference:  CM /  inches.

## 2023-08-09 ENCOUNTER — DOCUMENTATION ONLY (OUTPATIENT)
Dept: SLEEP MEDICINE | Facility: CLINIC | Age: 63
End: 2023-08-09
Payer: COMMERCIAL

## 2023-08-09 NOTE — PROGRESS NOTES
Pt returned HST device. It was downloaded and forwarded data to the clinical specialist for scoring.

## 2023-08-10 NOTE — PROGRESS NOTES
HST POST-STUDY QUESTIONNAIRE    What time did you go to bed?  11  How long do you think it took to fall asleep?  30 min  What time did you wake up to start the day?  7:15  Did you get up during the night at all?  yes  If you woke up, do you remember approximately what time(s)? 1, 4:15  Did you have any difficulty with the equipment?  No  Did you us any type of treatment with this study?  None  Was the head of the bed elevated? No  Did you sleep in a recliner?  No  Did you stop using CPAP at least 3 days before this test?  Yes  Any other information you'd like us to know? The experience was good, better than the overnight test.    This HSAT was performed using a Noxturnal T3 device which recorded snore, sound, movement activity, body position, nasal pressure, oronasal thermal airflow, pulse, oximetry and both chest and abdominal respiratory effort. HSAT data was restricted to the time patient states they were in bed.     HSAT was scored using 1B 4% hypopnea rule.     HST AHI (Non-PAT): 4.4  Snoring was reported as mild.  Time with SpO2 below 89% was 17.1 minutes.   Overall signal quality was good     Pt will follow up with sleep provider to determine appropriate therapy.

## 2023-08-11 NOTE — PROCEDURES
HOME SLEEP STUDY INTERPRETATION    Patient: Raj Nolan  MRN: [id number]  YOB: 1960  Study Date: 8/8/2023  Referring Provider: Julianne Yun; CNP  Ordering Provider: Mary ARDON CNP     Indications for Home Study: Raj Nolan is a 62 year old male with history of GAVIN to re-assess his need for CPAP.    Estimated body mass index is 28.9 kg/m  as calculated from the following:    Height as of 7/11/23: 1.829 m (6').    Weight as of 7/11/23: 96.6 kg (213 lb 1 oz).  Total score - Chase: 5 (8/7/2023  9:48 PM)  Total Score: 4 (8/7/2023  9:49 PM)    Data: A full night home sleep study was performed recording the standard physiologic parameters including body position, movement, sound, nasal pressure, thermal oral airflow, chest and abdominal movements with respiratory inductance plethysmography, and oxygen saturation by pulse oximetry. Pulse rate was estimated by oximetry recording. This study was considered adequate based on > 4 hours of quality oximetry and respiratory recording. As specified by the AASM Manual for the Scoring of Sleep and Associated events, version 2.3, Rule VIII.D 1B, 4% oxygen desaturation scoring for hypopneas is used as a standard of care on all home sleep apnea testing.    Analysis Time:  439.4 minutes    Respiration:   Sleep Associated Hypoxemia: sustained hypoxemia was present. Baseline oxygen saturation was 92%.  Time with saturation less than or equal to 88% was 6.3 minutes. The lowest oxygen saturation was 84%.   Snoring: Snoring was present.  Respiratory events: The home study revealed a presence of 0 obstructive apneas and 8 mixed and central apneas. There were 24 hypopneas resulting in a combined apnea/hypopnea index [AHI] of 4.4 events per hour.  AHI was 8.8 per hour supine, N/A per hour prone, 1.5 per hour on left side, and 3.5 per hour on right side.   Pattern: Excluding events noted above, respiratory rate and pattern was Normal.    Position:  Percent of time spent: supine - 29.3%, prone - 0%, on left - 35.3%, on right - 35.3%.    Heart Rate: By pulse oximetry normal rate was noted.     Assessment:   Patient did not rule in for obstructive sleep apnea.  Sleep associated hypoxemia was present.  While the patient did not rule in for GAVIN overall, I cannot conclusively determine that he no longer has GAVIN.     Recommendations:  Consider oral appliance therapy, positional therapy, or repeat in lab diagnostic PSG .  Suggest optimizing sleep hygiene and avoiding sleep deprivation.  Weight management.    Diagnosis Code(s): Hypoxemia G47.36, Snoring R06.83    Hira Angeles DO, August 11, 2023   Diplomate, American Board of Internal Medicine, Sleep Medicine

## 2023-08-23 ENCOUNTER — OFFICE VISIT (OUTPATIENT)
Dept: SLEEP MEDICINE | Facility: CLINIC | Age: 63
End: 2023-08-23
Payer: COMMERCIAL

## 2023-08-23 VITALS
BODY MASS INDEX: 28.02 KG/M2 | HEART RATE: 82 BPM | DIASTOLIC BLOOD PRESSURE: 66 MMHG | SYSTOLIC BLOOD PRESSURE: 106 MMHG | RESPIRATION RATE: 20 BRPM | WEIGHT: 206.6 LBS | OXYGEN SATURATION: 97 %

## 2023-08-23 DIAGNOSIS — G47.33 OSA (OBSTRUCTIVE SLEEP APNEA): ICD-10-CM

## 2023-08-23 DIAGNOSIS — R06.83 SNORING: Primary | ICD-10-CM

## 2023-08-23 PROCEDURE — 99213 OFFICE O/P EST LOW 20 MIN: CPT | Performed by: NURSE PRACTITIONER

## 2023-08-23 ASSESSMENT — SLEEP AND FATIGUE QUESTIONNAIRES
HOW LIKELY ARE YOU TO NOD OFF OR FALL ASLEEP WHILE WATCHING TV: SLIGHT CHANCE OF DOZING
HOW LIKELY ARE YOU TO NOD OFF OR FALL ASLEEP WHILE SITTING QUIETLY AFTER LUNCH WITHOUT ALCOHOL: WOULD NEVER DOZE
HOW LIKELY ARE YOU TO NOD OFF OR FALL ASLEEP WHILE SITTING AND READING: SLIGHT CHANCE OF DOZING
HOW LIKELY ARE YOU TO NOD OFF OR FALL ASLEEP WHILE SITTING INACTIVE IN A PUBLIC PLACE: WOULD NEVER DOZE
HOW LIKELY ARE YOU TO NOD OFF OR FALL ASLEEP WHILE LYING DOWN TO REST IN THE AFTERNOON WHEN CIRCUMSTANCES PERMIT: SLIGHT CHANCE OF DOZING
HOW LIKELY ARE YOU TO NOD OFF OR FALL ASLEEP WHEN YOU ARE A PASSENGER IN A CAR FOR AN HOUR WITHOUT A BREAK: SLIGHT CHANCE OF DOZING
HOW LIKELY ARE YOU TO NOD OFF OR FALL ASLEEP IN A CAR, WHILE STOPPED FOR A FEW MINUTES IN TRAFFIC: WOULD NEVER DOZE
HOW LIKELY ARE YOU TO NOD OFF OR FALL ASLEEP WHILE SITTING AND TALKING TO SOMEONE: WOULD NEVER DOZE

## 2023-08-23 NOTE — PATIENT INSTRUCTIONS
MY TREATMENT INFORMATION FOR SLEEP APNEA-  Raj Nolan    DOCTOR : REVA Posada CNP    BESIDES CPAP, WHAT OTHER THERAPIES ARE THERE?          Oral Appliance  What is oral appliance therapy?  An oral appliance device fits on your teeth at night like a retainer used after having braces. The device is made by a specialized dentist and requires several visits over 1-2 months before a manufactured device is made to fit your teeth and is adjusted to prevent your sleep apnea. Once an oral device is working properly, snoring should be improved. A home sleep test may be recommended at that time if to determine whether the sleep apnea is adequately treated.       Some things to remember:  -Oral devices are often, but not always, covered by your medical insurance. Be sure to check with your insurance provider.   -If you are referred for oral therapy, you will be given a list of specialized dentists to consider or you may choose to visit the Web site of the American Academy of Dental Sleep Medicine  -Oral devices are less likely to work if you have severe sleep apnea or are extremely overweight.     More detailed information  An oral appliance is a small acrylic device that fits over the upper and lower teeth  (similar to a retainer or a mouth guard). This device slightly moves jaw forward, which moves the base of the tongue forward, opens the airway, improves breathing for effective treat snoring and obstructive sleep apnea in perhaps 7 out of 10 people .  The best working devices are custom-made by a dental device  after a mold is made of the teeth 1, 2, 3.  When is an oral appliance indicated?  Oral appliance therapy is recommended as a first-line treatment for patients with primary snoring, mild sleep apnea, and for patients with moderate sleep apnea who prefer appliance therapy to use of CPAP4, 5. Severity of sleep apnea is determined by sleep testing and is based on the number of  respiratory events per hour of sleep.   How successful is oral appliance therapy?  The success rate of oral appliance therapy in patients with mild sleep apnea is 75-80% while in patients with moderate sleep apnea it is 50-70%. The chance of success in patients with severe sleep apnea is 40-50%. The research also shows that oral appliances have a beneficial effect on the cardiovascular health of GAVIN patients at the same magnitude as CPAP therapy7.  Oral appliances should be a second-line treatment in cases of severe sleep apnea, but if not completely successful then a combination therapy utilizing CPAP plus oral appliance therapy may be effective. Oral appliances tend to be effective in a broad range of patients although studies show that the patients who have the highest success are females, younger patients, those with milder disease, and less severe obesity. 3, 6.   Finding a dentist that practices dental sleep medicine  Specific training is available through the American Academy of Dental Sleep Medicine for dentists interested in working in the field of sleep. To find a dentist who is educated in the field of sleep and the use of oral appliances, near you, visit the Web site of the American Academy of Dental Sleep Medicine.    References  1. Mathew et al. Objectively measured vs self-reported compliance during oral appliance therapy for sleep-disordered breathing. Chest 2013; 144(5): 3736-0305.  2. Tez et al. Objective measurement of compliance during oral appliance therapy for sleep-disordered breathing. Thorax 2013; 68(1): 91-96.  3. David, et al. Mandibular advancement devices in 620 men and women with GAVIN and snoring: tolerability and predictors of treatment success. Chest 2004; 125: 5492-3879.  4. Mino, et al. Oral appliances for snoring and GAVIN: a review. Sleep 2006; 29: 244-262.  5. Mazin et al. Oral appliance treatment for GAVIN: an update. J Clin Sleep Med 2014; 10(2):  215-227.  6. frieda Olivo al. Predictors of OSAH treatment outcome. J Dent Res 2007; 86: 4493-9582.      Weight Loss:    Weight loss is a long-term strategy that may improve sleep apnea in some patients.    Weight management is a personal decision and the decision should be based on your interest and the potential benefits.  If you are interested in exploring weight loss strategies, the following discussion covers the impact on weight loss on sleep apnea and the approaches that may be successful.    Being overweight does not necessarily mean you will have health consequences.  Those who have BMI over 35 or over 27 with existing medical conditions carries greater risk.   Weight loss decreases severity of sleep apnea in most people with obesity. For those with mild obesity who have developed snoring with weight gain, even 15-30 pound weight loss can improve and occasionally eliminate sleep apnea.  Structured and life-long dietary and health habits are necessary to lose weight and keep healthier weight levels.     Though there may be significant health benefits from weight loss, long-term weight loss is very difficult to achieve- studies show success with dietary management in less than 10% of people. In addition, substantial weight loss may require years of dietary control and may be difficult if patients have severe obesity. In these cases, surgical management may be considered.  Finally, older individuals who have tolerated obesity without health complications may be less likely to benefit from weight loss strategies.      [unfilled]        Remember to Drive Safe... Drive Alive     Sleep health profoundly affects your health, mood, and your safety.  Thirty three percent of the population (one in three of us) is not getting enough sleep and many have a sleep disorder. Not getting enough sleep or having an untreated / undertreated sleep condition may make us sleepy without even knowing it. In fact, our driving could be  dramatically impaired due to our sleep health. As your provider, here are some things I would like you to know about driving:     Here are some warning signs for impairment and dangerous drowsy driving:              -Having been awake more than 16 hours               -Looking tired               -Eyelid drooping              -Head nodding (it could be too late at this point)              -Driving for more than 30 minutes     Some things you could do to make the driving safer if you are experiencing some drowsiness:              -Stop driving and rest              -Call for transportation              -Make sure your sleep disorder is adequately treated     Some things that have been shown NOT to work when experiencing drowsiness while driving:              -Turning on the radio              -Opening windows              -Eating any  distracting  /  entertaining  foods (e.g., sunflower seeds, candy, or any other)              -Talking on the phone      Your decision may not only impact your life, but also the life of others. Please, remember to drive safe for yourself and all of us.    [facility]  Search engine: https://mms.aadsm.org/members/directory/search_bootstrap.php?org_id=ADSM&   Certified in Dental Sleep Medicine    Dagoberto Arellano  Degree: DDS  [address]  [address]  [place] [address]  Professional Phone: [phone]  Website: [email or web address]    Ramsey Bragg  Degree: DDS  [facility]  [address]  [address]  [place] [address]  Professional Phone: [phone]: [phone]    Sharon Diop  [facility]  [address]  [place] [address]  Professional Phone: [phone]  Website: [email or web address]      Brendon Chong  Degree: DDS  [address]  [address]  [place] [address]  Professional Phone: [phone]  Fax: [phone]    Nicholas Magana  Degree:  RICK  [facility] [place] [facility]  [address]  [place] [address]  Professional Phone: [phone]  Website: [email or web address]      Diana Kim  [facility]-you should verify insurance coverage  [address]  [address]  [place] [address]  Professional Phone: [phone]  Website: [email or web address]      Donna Rapp--DOES NOT ACCEPT INSURANCE  Degree: RICK--you should verify insurance coverage  [facility]  [address]  [address]  [place] [address]  Professional Phone: [phone]     Anna Huber  Degree: RICK, PhD  [facility]  [address] [place] [address] [address] [place] [address],   [address]   [place] [address]   Appointments: [phone]   Fax: [phone]   [facility] [place]   [address]   [address]   [place] [address]   Appointments: [phone]   Fax: [phone]                Brendon Askew  Degree: RICK  [address] [place] [address]  Professional Phone: [phone]  Fax: [phone]  Website: [email or web address]      Howie Tovar  Degree: RICK  [facility]  [address] [place] [address]    Mariona Mulet Pradera  Degree: MS RICK  [facility]  [address] [place] [address]  Professional Phone: [phone]      Oumou Kevin  Degree: MS RICK  [facility]  [address]  [address]  [place] [address]  Professional Phone: [phone]    Betzy Javier  Degree: RICK  [facility]  [address]  [address]  [place] [address]  [place] [facility]     Wojciech Villegas  Degree: DDS  [facility]  [address] [place] [address]  Professional Phone: [phone]  Website:  [email or web address]      Homer Cope  Degree: RICK  [facility] [place]  [address] [place] [address]  Professional Phone: [phone]  Fax: [phone]      Matt Javed  Degree: RICK [facility]  [address]  [address]  [place] [address]    Levon Bhakta   Degree: RICK   [facility]   [address]   [address]  [place] [address]  Phone [phone]  Website: [email or web address]                 ACCEPT MEDICARE  Jewel Esparza DDS  [address], [place] [address]  [phone]; [phone] (fax)  [employer]    Darnell Recinos DDS, [facility]   [address]   [address]   [place] [address]   Appointments: [phone]   Fax: [phone]     Clovis Askew DDS   [address]   [place] [address]  Appointments [phone]      ADDITIONAL PROVIDERS    Rios Shaikh DDS   [facility]   [address]   [address]   [place] [address]   Appointments: [phone]   Fax: [phone]       Skinny Chávez DDS, [facility]  [address] [place] [address] [address]  [place] [address]   Appointments: [phone] Ext: [phone]  Fax: [phone]   [email or web address]          Your Body mass index is 28.02 kg/m .  Weight management is a personal decision.  If you are interested in exploring weight loss strategies, the following discussion covers the approaches that may be successful. Body mass index (BMI) is one way to tell whether you are at a healthy weight, overweight, or obese. It measures your weight in relation to your height.  A BMI of 18.5 to 24.9 is in the healthy range. A person with a BMI of 25 to 29.9 is considered overweight, and someone with a BMI of 30 or greater is considered obese. More than two-thirds of American adults are considered overweight or obese.  Being overweight or obese increases the risk for further weight  gain. Excess weight may lead to heart disease and diabetes.  Creating and following plans for healthy eating and physical activity may help you improve your health.  Weight control is part of healthy lifestyle and includes exercise, emotional health, and healthy eating habits. Careful eating habits lifelong are the mainstay of weight control. Though there are significant health benefits from weight loss, long-term weight loss with diet alone may be very difficult to achieve- studies show long-term success with dietary management in less than 10% of people. Attaining a healthy weight may be especially difficult to achieve in those with severe obesity. In some cases, medications, devices and surgical management might be considered.  What can you do?  If you are overweight or obese and are interested in methods for weight loss, you should discuss this with your provider.   Consider reducing daily calorie intake by 500 calories.   Keep a food journal.   Avoiding skipping meals, consider cutting portions instead.    Diet combined with exercise helps maintain muscle while optimizing fat loss. Strength training is particularly important for building and maintaining muscle mass. Exercise helps reduce stress, increase energy, and improves fitness. Increasing exercise without diet control, however, may not burn enough calories to loose weight.     Start walking three days a week 10-20 minutes at a time  Work towards walking thirty minutes five days a week   Eventually, increase the speed of your walking for 1-2 minutes at time    In addition, we recommend that you review healthy lifestyles and methods for weight loss available through the National Institutes of Health patient information sites:  http://win.niddk.nih.gov/publications/index.htm    And look into health and wellness programs that may be available through your health insurance provider, employer, local community center, or jim club.

## 2023-08-23 NOTE — PROGRESS NOTES
Chief Complaint   Patient presents with    Study Results     HST       Raj Nolan is a 62 year old male who returns to [facility] for review of sleep testing results. He presented with a history of GAVIN to reassess his need for CPAP.    Estimated body mass index is 28.9 kg/m  as calculated from the following:    Height as of 7/11/23: 1.829 m (6').    Weight as of 7/11/23: 96.6 kg (213 lb 1 oz).  Total score - Pimento: 5 (8/7/2023  9:48 PM)  Total Score: 4 (8/7/2023  9:49 PM)    Home Sleep Apnea Testing -  8/8/2023 : 206 lbs 9.6 oz: AHI 4.4/hr. Supine AHI 8.8/hr.   Oxygen Venu of 84%.  Baseline 92%.  Sp02 =< 88% for 6.3 minutes  He slept on his back (29.3%), prone (0%), left (35.3) and right (35.3%) sides.   Analysis time: 439.4 minutes.         Raj Nolan reports that he slept Fair .     Results were reviewed in detail today with Raj and a copy given to him for his records.    In addition, we did review the results from his initial sleep study revealing presence obstructive sleep apnea and AHI of 2.4 events/hour, with an AHI of 86.4 when he is supine and compared that to today's sleep test revealing an overall AHI of 4.4 events/hour, 8.8 events/hour when supine.  Most likely cause of the difference is his loss of 30 pounds.    Reviewed by team:  Tobacco  Allergies  Meds  Problems           Reviewed by provider:   Allergies  Meds  Problems               Problem List:  Patient Active Problem List    Diagnosis Date Noted    Testosterone deficiency 03/08/2023     Priority: Medium    Benign prostatic hyperplasia with nocturia 03/08/2023     Priority: Medium    Overweight 10/20/2021     Priority: Medium    Failed total knee arthroplasty, initial encounter (H) 01/20/2021     Priority: Medium    Low back pain 01/18/2021     Priority: Medium    Hypogonadism male 05/07/2020     Priority: Medium    Erectile dysfunction 02/18/2020     Priority: Medium    Obstructive sleep  apnea syndrome in adult 02/18/2020     Priority: Medium     Formatting of this note might be different from the original.  uses CPAP  Formatting of this note might be different from the original.  uses CPAP      Peripheral neuropathy, idiopathic 02/18/2020     Priority: Medium     Formatting of this note might be different from the original.  Created by Conversion    Replacement Utility updated for latest IMO load      Peyronie's disease 02/18/2020     Priority: Medium    Other abnormal glucose 04/23/2018     Priority: Medium    Spinal stenosis of lumbar region 03/22/2017     Priority: Medium    Insomnia 03/07/2016     Priority: Medium    History of total knee replacement 04/14/2015     Priority: Medium    Other postprocedural status(V45.89) 02/24/2015     Priority: Medium    Hypothyroidism 11/14/2014     Priority: Medium     Formatting of this note might be different from the original.  Replacement Utility updated for latest IMO load      Osteoarthritis of ankle and foot 11/14/2014     Priority: Medium     Formatting of this note might be different from the original.  Replacement Utility updated for latest IMO load      S/p bilateral carpal tunnel release 10/01/2013     Priority: Medium    Rupture of quadriceps tendon 12/31/2008     Priority: Medium        /66   Pulse 82   Resp 20   Wt 93.7 kg (206 lb 9.6 oz)   SpO2 97%   BMI 28.02 kg/m      Impression/Plan:  Mild obstructive supine sleep apnea  Sleep associated hypoxemia  Patient did not rule in for overall obstructive sleep apnea   Order placed for sleep dental referral for creation of a mandibular advancement device to assist with his snoring and mild obstructive supine sleep apnea, sleep associated hypoxemia   Patient may follow-up with sleep provider on an as needed basis   Recommend patient employ safe driving practices including not driving a motor vehicle should he become drowsy       30 minutes spent with patient, all of which were spent  face-to-face counseling, consulting, coordinating plan of care.      REVA Posada CNP    CC:  Julianne Yun,

## 2023-08-23 NOTE — NURSING NOTE
Chief Complaint   Patient presents with    Study Results     HST       Initial /66   Pulse 82   Resp 20   Wt 93.7 kg (206 lb 9.6 oz)   SpO2 97%   BMI 28.02 kg/m   Estimated body mass index is 28.02 kg/m  as calculated from the following:    Height as of 7/11/23: 1.829 m (6').    Weight as of this encounter: 93.7 kg (206 lb 9.6 oz).    Medication Reconciliation: complete      Daniela Mendieta MA  [facility]

## 2023-09-28 ENCOUNTER — TRANSFERRED RECORDS (OUTPATIENT)
Dept: HEALTH INFORMATION MANAGEMENT | Facility: CLINIC | Age: 63
End: 2023-09-28
Payer: COMMERCIAL

## 2023-10-19 ENCOUNTER — NURSE TRIAGE (OUTPATIENT)
Dept: NURSING | Facility: CLINIC | Age: 63
End: 2023-10-19
Payer: COMMERCIAL

## 2023-10-19 ENCOUNTER — MYC MEDICAL ADVICE (OUTPATIENT)
Dept: INTERNAL MEDICINE | Facility: CLINIC | Age: 63
End: 2023-10-19
Payer: COMMERCIAL

## 2023-10-19 DIAGNOSIS — U07.1 INFECTION DUE TO 2019 NOVEL CORONAVIRUS: Primary | ICD-10-CM

## 2023-10-20 NOTE — TELEPHONE ENCOUNTER
COVID Positive/Requesting COVID treatment    Patient is positive for COVID and requesting treatment options.    Date of positive COVID test (PCR or at home)? 10/19/23 home  Current COVID symptoms: cough, sore throat, and congestion or runny nose  Date COVID symptoms began: 10/18/23    Message should be routed to clinic RN pool. Best phone number to use for call back: [phone]    Patient declined triage    Ava Connor RN on 10/19/2023 at 7:19 PM    Reason for Disposition    Health Information question, no triage required and triager able to answer question    Protocols used: Information Only Call - No Triage-A-

## 2023-10-20 NOTE — TELEPHONE ENCOUNTER
RN COVID TREATMENT VISIT  10/20/23      The patient has been triaged and does not require a higher level of care.    Raj Nolan  62 year old  Current weight? 206    Has the patient been seen by a primary care provider at an [facility] or [facility] within the past two years? Yes.   Have you been in close proximity to/do you have a known exposure to a person with a confirmed case of influenza? No.     General treatment eligibility:  Date of positive COVID test (PCR or at home)?  10/19/2023    Are you or have you been hospitalized for this COVID-19 infection? No.   Have you received monoclonal antibodies or antiviral treatment for COVID-19 since this positive test? No.   Do you have any of the following conditions that place you at risk of being very sick from COVID-19?   - Age 50 years or older  - Overweight or Obesity (BMI >85th percentile or BMI 25 or higher)  Yes, patient has at least one high risk condition as noted above.     Current COVID symptoms:   - cough  - sore throat  - congestion or runny nose  Yes. Patient has at least one symptom as selected.     How many days since symptoms started? 5 days or less. Established patient, 12 years or older weighing at least 88.2 lbs, who has symptoms that started in the past 5 days, has not been hospitalized nor received treatment already, and is at risk for being very sick from COVID-19.     Treatment eligibility by RN:  Are you currently pregnant or nursing? No  Do you have a clinically significant hypersensitivity to nirmatrelvir or ritonavir, or toxic epidermal necrolysis (TEN) or Eli-Dandy Syndrome? No  Do you have a history of hepatitis, any hepatic impairment on the Problem List (such as Child-Ordonez Class C, cirrhosis, fatty liver disease, alcoholic liver disease), or was the last liver lab (hepatic panel, ALT, AST, ALK Phos, bilirubin) elevated in the past 6 months? No  Do you have any history of severe renal  impairment (eGFR < 30mL/min)? No    Is patient eligible to continue? Yes, patient meets all eligibility requirements for the RN COVID treatment (as denoted by all no responses above).     Current Outpatient Medications   Medication Sig Dispense Refill    Apple Cider Vinegar 500 MG TABS       atorvastatin (LIPITOR) 40 MG tablet Take 1 tablet (40 mg) by mouth daily 90 tablet 1    Cholecalciferol (VITAMIN D-3) 1000 UNITS CAPS       Coenzyme Q10 (COQ-10) 100 MG CAPS       Lactobacillus (PROBIOTIC ACIDOPHILUS) TABS       levothyroxine (SYNTHROID/LEVOTHROID) 137 MCG tablet TAKE 1 TABLET BY MOUTH EVERY DAY AT 6:00 AM Strength: 137 mcg 90 tablet 3    losartan-hydrochlorothiazide (HYZAAR) 50-12.5 MG tablet Take 1 tablet by mouth daily 90 tablet 3    magnesium oxide (MAG-OX) 400 MG tablet       multivitamin (ONE-DAILY) tablet Take 1 tablet by mouth daily      omeprazole (PRILOSEC) 40 MG DR capsule TAKE 1 CAPSULE BY MOUTH EVERY MORNING BEFORE BREAKFAST 90 capsule 3    tamsulosin (FLOMAX) 0.4 MG capsule Take 1 capsule (0.4 mg) by mouth At Bedtime 90 capsule 3       Medications from List 1 of the standing order (on medications that exclude the use of Paxlovid) that patient is taking: NONE. Is patient taking [place]'s Wort? No  Is patient taking Mady's Wort or any meds from List 1? No.   Medications from List 2 of the standing order (on meds that provider needs to adjust) that patient is taking: NONE. Is patient on any of the meds from List 2? No.   Medications from List 3 of standing order (on meds that a RN needs to adjust) that patient is taking: atorvastatin (Lipitor): Instructed patient to stop atorvastatin while taking Paxlovid and restart atorvastatin 1 day after the completion of Paxlovid.  Is patient on any meds from List 3? Yes. Patient is on meds from list 3. No meds require a provider visit and at least one med required RN to adjust.     Paxlovid has an approximate 90% reduction in hospitalization. Paxlovid can  possibly cause altered sense of taste, diarrhea (loose, watery stools), high blood pressure, muscle aches.     Would patient like a Paxlovid prescription?   Yes.   Lab Results   Component Value Date    GFRESTIMATED 82 04/10/2023       Was last eGFR reduced? No, eGFR 60 or greater/ No Result on record. Patient can receive the normal renal function dose.   Temporary change to home medications: atorvastatin (Lipitor): Instructed patient to stop atorvastatin while taking Paxlovid and restart atorvastatin 1 day after the completion of Paxlovid.     All medication adjustments (holds, etc) were discussed with the patient and patient was asked to repeat back (teachback) their med adjustment.  Did patient understand med adjustment? Yes, patient repeated back and understood correctly.        Reviewed the following instructions with the patient:    Paxlovid (nimatrelvir and ritonavir)    How it works  Two medicines (nirmatrelvir and ritonavir) are taken together. They stop the virus from growing. Less amount of virus is easier for your body to fight.    How to take  Medicine comes in a daily container with both medicine tablets. Take by mouth twice daily (once in the morning, once at night) for 5 days.  The number of tablets to take varies by patient.  Don't chew or break capsules. Swallow whole.    When to take  Take as soon as possible after positive COVID-19 test result, and within 5 days of your first symptoms.    Possible side effects  Can cause altered sense of taste, diarrhea (loose, watery stools), high blood pressure, muscle aches.    Yoanna Andrade RN

## 2023-10-20 NOTE — TELEPHONE ENCOUNTER
Call placed to patient and Paxlovid prescribed, see separate Nurse Triage encounter dated 10/19/23.

## 2023-11-24 ENCOUNTER — MYC MEDICAL ADVICE (OUTPATIENT)
Dept: INTERNAL MEDICINE | Facility: CLINIC | Age: 63
End: 2023-11-24
Payer: COMMERCIAL

## 2023-12-05 DIAGNOSIS — E78.00 HYPERCHOLESTEROLEMIA: ICD-10-CM

## 2023-12-05 RX ORDER — ATORVASTATIN CALCIUM 40 MG/1
40 TABLET, FILM COATED ORAL DAILY
Qty: 90 TABLET | Refills: 0 | Status: SHIPPED | OUTPATIENT
Start: 2023-12-05 | End: 2024-05-09

## 2024-03-04 DIAGNOSIS — E03.9 ACQUIRED HYPOTHYROIDISM: ICD-10-CM

## 2024-03-04 DIAGNOSIS — K21.00 GASTROESOPHAGEAL REFLUX DISEASE WITH ESOPHAGITIS WITHOUT HEMORRHAGE: ICD-10-CM

## 2024-03-04 DIAGNOSIS — R35.1 NOCTURIA: ICD-10-CM

## 2024-03-04 RX ORDER — TAMSULOSIN HYDROCHLORIDE 0.4 MG/1
0.4 CAPSULE ORAL AT BEDTIME
Qty: 90 CAPSULE | Refills: 0 | Status: SHIPPED | OUTPATIENT
Start: 2024-03-04

## 2024-03-04 RX ORDER — LEVOTHYROXINE SODIUM 137 UG/1
TABLET ORAL
Qty: 90 TABLET | Refills: 0 | Status: SHIPPED | OUTPATIENT
Start: 2024-03-04 | End: 2024-07-09

## 2024-03-05 NOTE — TELEPHONE ENCOUNTER
I spoke to the patient, he is going to look for another provider within [facility]. He is wanting this refilled though, he will schedule but is currently driving. I sent him a MyChart on list of providers accepting new patients within [facility]. Please bridge,

## 2024-03-06 RX ORDER — OMEPRAZOLE 40 MG/1
CAPSULE, DELAYED RELEASE ORAL
Qty: 90 CAPSULE | Refills: 1 | Status: SHIPPED | OUTPATIENT
Start: 2024-03-06 | End: 2024-09-13

## 2024-04-28 ENCOUNTER — HEALTH MAINTENANCE LETTER (OUTPATIENT)
Age: 64
End: 2024-04-28

## 2024-05-09 ENCOUNTER — MYC REFILL (OUTPATIENT)
Dept: INTERNAL MEDICINE | Facility: CLINIC | Age: 64
End: 2024-05-09
Payer: COMMERCIAL

## 2024-05-09 DIAGNOSIS — E78.00 HYPERCHOLESTEROLEMIA: ICD-10-CM

## 2024-05-09 RX ORDER — ATORVASTATIN CALCIUM 40 MG/1
40 TABLET, FILM COATED ORAL DAILY
Qty: 90 TABLET | Refills: 0 | OUTPATIENT
Start: 2024-05-09

## 2024-05-09 RX ORDER — ATORVASTATIN CALCIUM 40 MG/1
40 TABLET, FILM COATED ORAL DAILY
Qty: 90 TABLET | Refills: 0 | Status: SHIPPED | OUTPATIENT
Start: 2024-05-09 | End: 2024-08-15

## 2024-06-04 ENCOUNTER — TELEPHONE (OUTPATIENT)
Dept: FAMILY MEDICINE | Facility: CLINIC | Age: 64
End: 2024-06-04

## 2024-06-04 ENCOUNTER — OFFICE VISIT (OUTPATIENT)
Dept: FAMILY MEDICINE | Facility: CLINIC | Age: 64
End: 2024-06-04
Payer: COMMERCIAL

## 2024-06-04 VITALS
RESPIRATION RATE: 14 BRPM | BODY MASS INDEX: 28.31 KG/M2 | DIASTOLIC BLOOD PRESSURE: 80 MMHG | WEIGHT: 209 LBS | HEART RATE: 59 BPM | TEMPERATURE: 98.3 F | HEIGHT: 72 IN | OXYGEN SATURATION: 98 % | SYSTOLIC BLOOD PRESSURE: 129 MMHG

## 2024-06-04 DIAGNOSIS — E03.9 HYPOTHYROIDISM, UNSPECIFIED TYPE: ICD-10-CM

## 2024-06-04 DIAGNOSIS — E78.00 HYPERCHOLESTEROLEMIA: Primary | ICD-10-CM

## 2024-06-04 DIAGNOSIS — N40.1 BENIGN PROSTATIC HYPERPLASIA WITH LOWER URINARY TRACT SYMPTOMS, SYMPTOM DETAILS UNSPECIFIED: ICD-10-CM

## 2024-06-04 DIAGNOSIS — Z13.1 ENCOUNTER FOR SCREENING FOR DIABETES MELLITUS: ICD-10-CM

## 2024-06-04 DIAGNOSIS — I10 ESSENTIAL HYPERTENSION: ICD-10-CM

## 2024-06-04 DIAGNOSIS — G89.29 CHRONIC BILATERAL LOW BACK PAIN WITHOUT SCIATICA: ICD-10-CM

## 2024-06-04 DIAGNOSIS — N52.9 ERECTILE DYSFUNCTION, UNSPECIFIED ERECTILE DYSFUNCTION TYPE: ICD-10-CM

## 2024-06-04 DIAGNOSIS — M54.50 CHRONIC BILATERAL LOW BACK PAIN WITHOUT SCIATICA: ICD-10-CM

## 2024-06-04 DIAGNOSIS — Z12.5 ENCOUNTER FOR SCREENING FOR MALIGNANT NEOPLASM OF PROSTATE: ICD-10-CM

## 2024-06-04 LAB — HBA1C MFR BLD: 5.8 % (ref 0–5.6)

## 2024-06-04 PROCEDURE — 84443 ASSAY THYROID STIM HORMONE: CPT | Performed by: FAMILY MEDICINE

## 2024-06-04 PROCEDURE — 80061 LIPID PANEL: CPT | Performed by: FAMILY MEDICINE

## 2024-06-04 PROCEDURE — G2211 COMPLEX E/M VISIT ADD ON: HCPCS | Performed by: FAMILY MEDICINE

## 2024-06-04 PROCEDURE — 99214 OFFICE O/P EST MOD 30 MIN: CPT | Performed by: FAMILY MEDICINE

## 2024-06-04 PROCEDURE — 80048 BASIC METABOLIC PNL TOTAL CA: CPT | Performed by: FAMILY MEDICINE

## 2024-06-04 PROCEDURE — G0103 PSA SCREENING: HCPCS | Performed by: FAMILY MEDICINE

## 2024-06-04 PROCEDURE — 83036 HEMOGLOBIN GLYCOSYLATED A1C: CPT | Performed by: FAMILY MEDICINE

## 2024-06-04 PROCEDURE — 36415 COLL VENOUS BLD VENIPUNCTURE: CPT | Performed by: FAMILY MEDICINE

## 2024-06-04 RX ORDER — TADALAFIL 5 MG/1
5 TABLET ORAL EVERY 24 HOURS
Qty: 30 TABLET | Refills: 3 | Status: SHIPPED | OUTPATIENT
Start: 2024-06-04 | End: 2024-10-07

## 2024-06-04 RX ORDER — RESPIRATORY SYNCYTIAL VIRUS VACCINE 120MCG/0.5
0.5 KIT INTRAMUSCULAR ONCE
Qty: 1 EACH | Refills: 0 | Status: CANCELLED | OUTPATIENT
Start: 2024-06-04 | End: 2024-06-04

## 2024-06-04 NOTE — TELEPHONE ENCOUNTER
Prior Authorization Specialty Medication Request    Medication/Dose: tadalafil (CIALIS) 5 MG tablet  Diagnosis and ICD code (if different than what is on RX):    New/renewal/insurance change PA/secondary ins. PA:  Previously Tried and Failed:      Important Lab Values:   Rationale:     Insurance   Primary: [employer] MEDICARE ADVANTAGE   Insurance ID:  [id number]     Secondary (if applicable):  Insurance ID:      Pharmacy Information (if different than what is on RX)  Name:    Phone:    Fax:

## 2024-06-04 NOTE — PROGRESS NOTES
Assessment & Plan   1. Hypercholesterolemia  - On atorvastatin, check cholesterol levels  - Lipid panel reflex to direct LDL Non-fasting  - Basic metabolic panel    2. Hypothyroidism  - On levo, monitoring therapy  - TSH WITH FREE T4 REFLEX  - Hemoglobin A1c    3. Essential hypertension  - Normotensive in clinic today, continue losartan-hydrochlorothiazide. Call for refills    4. Benign prostatic hyperplasia with lower urinary tract symptoms, symptom details unspecified  - Monitor PSA  - Prostate spec antigen screen    5. Encounter for screening for malignant neoplasm of prostate  - Prostate spec antigen screen    6. Encounter for screening for diabetes mellitus  - Family hx, BMI 28, HTN and hypothyroid  - Hemoglobin A1c    7. Erectile dysfunction, unspecified erectile dysfunction type  - Would like to restart. Starting at low dose, can increase to two tablets if tolerating and not at full effect  - tadalafil (CIALIS) 5 MG tablet; Take 1 tablet (5 mg) by mouth every 24 hours  Dispense: 30 tablet; Refill: 3    8. Chronic bilateral low back pain without sciatica  - Continue to follow with Ortho  - Encouraged remaining active    9. Pruritus  - only at night on head and neck  - Suspect allergen/contact, possibly due to pillow  - Recommend changing out pillow. Can use OTC anti-allergy medications prn    The longitudinal plan of care for the diagnosis(es)/condition(s) as documented were addressed during this visit. Due to the added complexity in care, I will continue to support Raj in the subsequent management and with ongoing continuity of care.            Nicotine/Tobacco Cessation  He reports that he has been smoking cigars. He has never used smokeless tobacco.  Nicotine/Tobacco Cessation Plan  Will need follow up on this at future appt.       BMI  Estimated body mass index is 28.35 kg/m  as calculated from the following:    Height as of this encounter: 1.829 m (6').    Weight as of this encounter: 94.8 kg (209  "lb).       Return in about 3 months (around 9/4/2024) for Routine preventive welcome to Medicare.    Subjective   Raj is a 63 year old, presenting for the following health issues:  Establish Care (Overall physical)      6/4/2024     8:08 AM   Additional Questions   Roomed by david   Accompanied by self         6/4/2024    Information    services provided? No     HPI     Has OA: Has had hip replacement bilaterally and knee replacement bilaterally with revisions (says the first replacements \"came loose\"). Has seen a spine surgeon, recommended against surgery despite arthritis in his bike. Has continued to play golf multiple times a week.     Has ED, has been on cialis in the past. Also was told it would help with his BPH, for which he is on tamsulosin.  Wakes up anywhere from 1-4x a night.      Has hx of hypothyroidism, on levothyroxine 137 mcg.     On atorvastatin for dyslipidemia.     Notes itching on his back and neck, most at night and first thing in the morning. Does not notice it during the day. Has used the same pillow for multiple years, unclear on detergents.           Objective    /80 (BP Location: Left arm, Patient Position: Sitting, Cuff Size: Adult Large)   Pulse 59   Temp 98.3  F (36.8  C) (Oral)   Ht 1.829 m (6')   Wt 94.8 kg (209 lb)   SpO2 98%   BMI 28.35 kg/m    Body mass index is 28.35 kg/m .  Physical Exam   General: Alert, conversant, cooperative. No acute distress. .  Head, Eyes, Ears, Nose & Throat: Head without evidence of trauma. EOMI. MMM. .  Cardiovascular: Regular rate. Regular rhythm. No murmur. .  Respiratory: Lungs clear to auscultation bilaterally. No increased work of breathing. .  Extremities: No cyanosis. No edema. .  Skin: Warm & dry. No rashes. .  Neurologic: Moving all extremities. .  Psychiatric: Normal affect..          Signed Electronically by: Iglesia Obando MD    "

## 2024-06-04 NOTE — PATIENT INSTRUCTIONS
Patient Education   Here is the plan from today's visit    1. Hypercholesterolemia  - We will check your labs today including your cholesterol    - Lipid panel reflex to direct LDL Non-fasting  - Basic metabolic panel    2. Hypothyroidism  - Monitoring your treatment  - TSH WITH FREE T4 REFLEX  - Hemoglobin A1c    3. Essential hypertension  - Continue your blood pressure medication    4. Benign prostatic hyperplasia with lower urinary tract symptoms, symptom details unspecified  - Continue the tamsulosin.   - Prostate spec antigen screen    5. Encounter for screening for malignant neoplasm of prostate  - Prostate spec antigen screen    6. Encounter for screening for diabetes mellitus  - Hemoglobin A1c    7. Erectile dysfunction, unspecified erectile dysfunction type  - Take 1 tablet the first 2-3 times, then increase to 10 mg if desired.   - tadalafil (CIALIS) 5 MG tablet; Take 1 tablet (5 mg) by mouth every 24 hours  Dispense: 30 tablet; Refill: 3    Please call or return to clinic if your symptoms don't go away.    Follow up plan  Return in about 3 months (around 9/4/2024) for Routine preventive welcome to Medicare.    Thank you for coming to [facility] today.  Lab Testing:  **If you had lab testing today and your results are reassuring or normal they will be mailed to you or sent through [employer] within 7 days.   **If the lab tests need quick action we will call you with the results.  **If you are having labs done on a different day, please call [phone] to schedule at [facility] or [phone] for other [facility] locations. Labs do not offer walk-in appointments.  The phone number we will call with results is # [phone] (home) [phone] (work). If this is not the best number please call our clinic and change the number.  Medication Refills:  If you need any refills please call your pharmacy and they will contact us.   If you need to  your refill at a new pharmacy,  please contact the new pharmacy directly. The new pharmacy will help you get your medications transferred faster.   Scheduling:  If you have any concerns about today's visit or wish to schedule another appointment please call our office during normal business hours [phone] (8-5:00 M-F). If you can no longer make a scheduled visit, please cancel via [employer] or call us to cancel.   If a referral was made to an [facility] specialty provider and you do not get a call from central scheduling, please refer to directions on your visit summary or call our office during normal business hours for assistance.   If a Mammogram was ordered for you at the [facility] call [phone] to schedule or change your appointment.  If you had an XRay/CT/Ultrasound/MRI ordered the number is [phone] to schedule or change your radiology appointment.   [facility] has limited ultrasound appointments available on Wednesdays, if you would like your ultrasound at [facility], please call [phone] to schedule.   Medical Concerns:  If you have urgent medical concerns please call [phone] at any time of the day.    Iglesia Obando MD

## 2024-06-05 LAB
ANION GAP SERPL CALCULATED.3IONS-SCNC: 11 MMOL/L (ref 7–15)
BUN SERPL-MCNC: 19.8 MG/DL (ref 8–23)
CALCIUM SERPL-MCNC: 9.5 MG/DL (ref 8.8–10.2)
CHLORIDE SERPL-SCNC: 108 MMOL/L (ref 98–107)
CHOLEST SERPL-MCNC: 144 MG/DL
CREAT SERPL-MCNC: 1 MG/DL (ref 0.67–1.17)
DEPRECATED HCO3 PLAS-SCNC: 24 MMOL/L (ref 22–29)
EGFRCR SERPLBLD CKD-EPI 2021: 84 ML/MIN/1.73M2
FASTING STATUS PATIENT QL REPORTED: ABNORMAL
FASTING STATUS PATIENT QL REPORTED: NORMAL
GLUCOSE SERPL-MCNC: 102 MG/DL (ref 70–99)
HDLC SERPL-MCNC: 52 MG/DL
LDLC SERPL CALC-MCNC: 76 MG/DL
NONHDLC SERPL-MCNC: 92 MG/DL
POTASSIUM SERPL-SCNC: 4.2 MMOL/L (ref 3.4–5.3)
PSA SERPL DL<=0.01 NG/ML-MCNC: 2.11 NG/ML (ref 0–4.5)
SODIUM SERPL-SCNC: 143 MMOL/L (ref 135–145)
TRIGL SERPL-MCNC: 81 MG/DL
TSH SERPL DL<=0.005 MIU/L-ACNC: 2.12 UIU/ML (ref 0.3–4.2)

## 2024-06-11 DIAGNOSIS — I10 ESSENTIAL HYPERTENSION: ICD-10-CM

## 2024-06-11 RX ORDER — LOSARTAN POTASSIUM AND HYDROCHLOROTHIAZIDE 12.5; 5 MG/1; MG/1
1 TABLET ORAL DAILY
Qty: 90 TABLET | Refills: 2 | Status: SHIPPED | OUTPATIENT
Start: 2024-06-11

## 2024-06-13 NOTE — TELEPHONE ENCOUNTER
Retail Pharmacy Prior Authorization Team   Phone: [phone]    PRIOR AUTHORIZATION DENIED    Medication: TADALAFIL 5 MG PO TABS  Insurance Company: [employer] - Phone [phone] Fax [phone]  Denial Date: 6/13/2024  Denial Reason(s): NOT COVERED, ALTERNATE OPTIONS FOR DX OF PROSTATE HYPERPLASIA WERE GIVEN BUT NOTHING FOR ED         Appeal Information:

## 2024-06-13 NOTE — TELEPHONE ENCOUNTER
Retail Pharmacy Prior Authorization Team   Phone: [phone]    PA Initiation    Medication: TADALAFIL 5 MG PO TABS  Insurance Company: [employer] - Phone [phone] Fax [phone]  Pharmacy Filling the Rx: [id number] - [place], [place] - [address] AT [facility] [address]  Filling Pharmacy Phone: [phone]  Filling Pharmacy Fax:    Start Date: 6/13/2024      Note: Due to record-high volumes, our turn-around time is taking longer than usual . We are currently 2 weeks behind in the pools.   We are working diligently to submit all requests in a timely manner and in the order they are received. Please only flag TRUE URGENT requests as high priority to the pool at this time.   If you have questions on status of PA's,  please send a note/message in the active PA encounter and send back to the [facility] PA pool [[id number]].    If you have questions about the turn-around time or about our process, please reach out to our supervisor Elodia Sánchez.   Thank you!   RPPA (Retail Pharmacy Prior Authorization) team

## 2024-06-17 NOTE — TELEPHONE ENCOUNTER
Called patient per provider request that cialis is not covered under his insurance. I let him know that he could get the medication fairly inexpensively with [employer] or other pharmacy discount cards.    Expressed understanding    Brit Lee RN  .

## 2024-07-09 DIAGNOSIS — E03.9 ACQUIRED HYPOTHYROIDISM: ICD-10-CM

## 2024-07-09 RX ORDER — LEVOTHYROXINE SODIUM 137 UG/1
TABLET ORAL
Qty: 90 TABLET | Refills: 3 | Status: SHIPPED | OUTPATIENT
Start: 2024-07-09

## 2024-08-15 DIAGNOSIS — E78.00 HYPERCHOLESTEROLEMIA: ICD-10-CM

## 2024-08-15 RX ORDER — ATORVASTATIN CALCIUM 40 MG/1
40 TABLET, FILM COATED ORAL DAILY
Qty: 90 TABLET | Refills: 2 | Status: SHIPPED | OUTPATIENT
Start: 2024-08-15

## 2024-09-13 DIAGNOSIS — K21.00 GASTROESOPHAGEAL REFLUX DISEASE WITH ESOPHAGITIS WITHOUT HEMORRHAGE: ICD-10-CM

## 2024-09-13 RX ORDER — OMEPRAZOLE 40 MG/1
CAPSULE, DELAYED RELEASE ORAL
Qty: 90 CAPSULE | Refills: 0 | Status: SHIPPED | OUTPATIENT
Start: 2024-09-13

## 2024-10-07 ENCOUNTER — OFFICE VISIT (OUTPATIENT)
Dept: FAMILY MEDICINE | Facility: CLINIC | Age: 64
End: 2024-10-07
Payer: COMMERCIAL

## 2024-10-07 VITALS
OXYGEN SATURATION: 97 % | DIASTOLIC BLOOD PRESSURE: 84 MMHG | HEART RATE: 68 BPM | WEIGHT: 215.4 LBS | SYSTOLIC BLOOD PRESSURE: 138 MMHG | RESPIRATION RATE: 12 BRPM | TEMPERATURE: 98.3 F | HEIGHT: 72 IN | BODY MASS INDEX: 29.17 KG/M2

## 2024-10-07 DIAGNOSIS — N52.9 ERECTILE DYSFUNCTION, UNSPECIFIED ERECTILE DYSFUNCTION TYPE: ICD-10-CM

## 2024-10-07 DIAGNOSIS — R00.2 PALPITATIONS: ICD-10-CM

## 2024-10-07 DIAGNOSIS — Z23 ENCOUNTER FOR IMMUNIZATION: ICD-10-CM

## 2024-10-07 DIAGNOSIS — I49.3 PVC'S (PREMATURE VENTRICULAR CONTRACTIONS): ICD-10-CM

## 2024-10-07 DIAGNOSIS — Z23 NEED FOR VACCINATION: Primary | ICD-10-CM

## 2024-10-07 DIAGNOSIS — H90.6 MIXED CONDUCTIVE AND SENSORINEURAL HEARING LOSS OF BOTH EARS: ICD-10-CM

## 2024-10-07 DIAGNOSIS — Z87.891 HISTORY OF SMOKING: ICD-10-CM

## 2024-10-07 DIAGNOSIS — Z13.6 SCREENING FOR AAA (ABDOMINAL AORTIC ANEURYSM): ICD-10-CM

## 2024-10-07 DIAGNOSIS — Z00.00 WELCOME TO MEDICARE PREVENTIVE VISIT: ICD-10-CM

## 2024-10-07 DIAGNOSIS — Z29.11 NEED FOR VACCINATION AGAINST RESPIRATORY SYNCYTIAL VIRUS: ICD-10-CM

## 2024-10-07 PROCEDURE — G0402 INITIAL PREVENTIVE EXAM: HCPCS | Performed by: FAMILY MEDICINE

## 2024-10-07 PROCEDURE — 90677 PCV20 VACCINE IM: CPT | Performed by: FAMILY MEDICINE

## 2024-10-07 PROCEDURE — 99214 OFFICE O/P EST MOD 30 MIN: CPT | Mod: 25 | Performed by: FAMILY MEDICINE

## 2024-10-07 PROCEDURE — G0009 ADMIN PNEUMOCOCCAL VACCINE: HCPCS | Performed by: FAMILY MEDICINE

## 2024-10-07 PROCEDURE — G0405 EKG INTERPRET & REPORT PREVE: HCPCS | Performed by: FAMILY MEDICINE

## 2024-10-07 RX ORDER — CETIRIZINE HYDROCHLORIDE 10 MG/1
TABLET ORAL EVERY OTHER DAY
COMMUNITY
Start: 2023-01-01

## 2024-10-07 RX ORDER — TADALAFIL 5 MG/1
10 TABLET ORAL EVERY 24 HOURS
Qty: 30 TABLET | Refills: 3 | Status: SHIPPED | OUTPATIENT
Start: 2024-10-07 | End: 2024-10-08

## 2024-10-07 SDOH — HEALTH STABILITY: PHYSICAL HEALTH: ON AVERAGE, HOW MANY MINUTES DO YOU ENGAGE IN EXERCISE AT THIS LEVEL?: 30 MIN

## 2024-10-07 SDOH — HEALTH STABILITY: PHYSICAL HEALTH: ON AVERAGE, HOW MANY DAYS PER WEEK DO YOU ENGAGE IN MODERATE TO STRENUOUS EXERCISE (LIKE A BRISK WALK)?: 2 DAYS

## 2024-10-07 ASSESSMENT — SOCIAL DETERMINANTS OF HEALTH (SDOH): HOW OFTEN DO YOU GET TOGETHER WITH FRIENDS OR RELATIVES?: THREE TIMES A WEEK

## 2024-10-07 NOTE — PROGRESS NOTES
Preventive Care Visit  [facility] CHUY Obando MD, Family Medicine  Oct 7, 2024    1. Welcome to Medicare preventive visit  2. Encounter for immunization  3. Need for vaccination against respiratory syncytial virus  4. Need for vaccination  - honoring your wishes provided to patient, reviewed need for notary or witnesses and is to upload to [employer] or bring to next visit  - Other screenings as below, overall doing well  - Deferred COVID and flu as did not want multiple vaccines at some visit, will get them outpatient  - PNEUMOCOCCAL 20 VALENT CONJUGATE (PREVNAR 20)  - RSV vaccine, bivalent, ABRYSVO, injection; Inject 0.5 mLs into the muscle once for 1 dose. Pharmacist administered  Dispense: 0.5 mL; Refill: 0    5. Screening for AAA (abdominal aortic aneurysm)  6. History of smoking  - Qualifies for AAA, ordered  - Abdominal Aortic Aneurysm Screening/Tracking  - US Abdominal Aorta Imaging; Future    7. PVC's (premature ventricular contractions)  8. Palpitations  - ECG shows one PVC, no other significant findings concerning for arrhythmias or cardiac disease.   - Discussed clinical monitoring vs further workup, patient requests further workup   - ZIO PATCH MAIL OUT; Future    9. Erectile dysfunction, unspecified erectile dysfunction type  - Increase to 10 mg,   - tadalafil (CIALIS) 10 MG tablet; Take 1 tablets by mouth every 24 hours.  Dispense: 60 tablet; Refill: 3    10. Mixed conductive and sensorineural hearing loss of both ears  - Requests referral for evaluation, see screening questions below  - Adult Audiology  Referral; Future      Subjective   Raj is a 63 year old, presenting for the following:  Physical (Discuss hear rhythm, chest tightness)        10/7/2024     4:18 PM   Additional Questions   Roomed by david   Accompanied by self         10/7/2024    Information    services provided? No           Health Care Directive  Patient does not have a  "Health Care Directive or Living Will: Discussed advance care planning with patient; information given to patient to review.    HPI  Mother  2 weeks ago after complications from a broken hip including joint infection and pneumonia. Had been diagnosed with dementia and having difficulty with short term memory. Feels that level of grief is appropriate.    Does note that since that time he has intermittently been checking his pulse and feels that he \"misses beats.\" Typically happens about once a minute, but will have periods of several in a 10 second period. Not symptomatic during them.     On sildenafil for ED. Gets some response but not satisfied. Using 10 mg with no side effects.        10/7/2024   General Health   How would you rate your overall physical health? Good   Feel stress (tense, anxious, or unable to sleep) Only a little      (!) STRESS CONCERN      10/7/2024   Nutrition   Diet: Regular (no restrictions)            10/7/2024   Exercise   Days per week of moderate/strenous exercise 2 days   Average minutes spent exercising at this level 30 min    Plays golf multiple a week as well      (!) EXERCISE CONCERN      10/7/2024   Social Factors   Frequency of gathering with friends or relatives Three times a week   Worry food won't last until get money to buy more No   Food not last or not have enough money for food? No   Do you have housing? (Housing is defined as stable permanent housing and does not include staying ouside in a car, in a tent, in an abandoned building, in an overnight shelter, or couch-surfing.) Yes   Are you worried about losing your housing? No   Lack of transportation? No   Unable to get utilities (heat,electricity)? No            10/7/2024   Fall Risk   Fallen 2 or more times in the past year? No   Trouble with walking or balance? Yes   Gait Speed Test (Document in seconds) 0.3   Gait Speed Test Interpretation Less than or equal to 5.00 seconds - PASS       Has osteoarthritis (knee " replacement bilaterally with revision) and chronic back pain with spinal stenosis. Has been told in the past he would likely need back surgery.           10/7/2024   Activities of Daily Living- Home Safety   Needs help with the following daily activites None of the above   Safety concerns in the home None of the above            10/7/2024   Dental   Dentist two times every year? Yes            10/7/2024   Hearing Screening   Hearing concerns? (!) IT'S HARD TO FOLLOW A CONVERSATION IN A NOISY RESTAURANT OR CROWDED ROOM.    (!) TROUBLE UNDERSTANDING SOFT OR WHISPERED SPEECH.       Multiple values from one day are sorted in reverse-chronological order         10/7/2024   Driving Risk Screening   Patient/family members have concerns about driving No            10/7/2024   General Alertness/Fatigue Screening   Have you been more tired than usual lately? No            10/7/2024   Urinary Incontinence Screening   Bothered by leaking urine in past 6 months No            10/7/2024   TB Screening   Were you born outside of the [place]? No            Today's PHQ-2 Score:       10/7/2024     3:17 PM   PHQ-2 ( 1999 Pf[employer])   Q1: Little interest or pleasure in doing things 0   Q2: Feeling down, depressed or hopeless 0   PHQ-2 Score 0   Q1: Little interest or pleasure in doing things Not at all   Q2: Feeling down, depressed or hopeless Not at all   PHQ-2 Score 0           10/7/2024   Substance Use   If I could quit smoking, I would Completely agree   I want to quit somking, worry about health affects Completely agree   Willing to make a plan to quit smoking Completely agree   Willing to cut down before quitting Completely agree   Alcohol more than 3/day or more than 7/wk No   Do you have a current opioid prescription? No   How severe/bad is pain from 1 to 10? 6/10   Do you use any other substances recreationally? No        Social History     Tobacco Use    Smoking status: Former     Types: Cigars    Smokeless tobacco: Never    Tobacco  comments:     cigar- seldom   Vaping Use    Vaping status: Never Used   Substance Use Topics    Alcohol use: Yes     Alcohol/week: 7.0 standard drinks of alcohol     Types: 7 Standard drinks or equivalent per week     Comment: Alcoholic Drinks/day:    Drug use: No   Has not had a cigar in 3 years. Prior had them 2-3 times a year.       Last PSA:   Prostate Specific Antigen Screen   Date Value Ref Range Status   06/04/2024 2.11 0.00 - 4.50 ng/mL Final   10/20/2021 1.86 0.00 - 4.50 ug/L Final     ASCVD Risk   The 10-year ASCVD risk score (Kate MARIE, et al., 2019) is: 13.1%    Values used to calculate the score:      Age: 63 years      Sex: Male      Is Non- : No      Diabetic: No      Tobacco smoker: No      Systolic Blood Pressure: 138 mmHg      Is BP treated: Yes      HDL Cholesterol: 52 mg/dL      Total Cholesterol: 144 mg/dL          Reviewed and updated as needed this visit by Provider                    Current providers sharing in care for this patient include:  Patient Care Team:  Iglesia Obando MD as PCP - Pb Zapien MD as MD (Family Practice)  Pierre Soler MD as MD (Neurology)  Mary Burrell APRN CNP as Assigned Pulmonology Provider  Iglesia Obando MD as Assigned PCP    The following health maintenance items are reviewed in Epic and correct as of today:  Health Maintenance   Topic Date Due    Pneumococcal Vaccine: 65+ Years (1 of 1 - PCV) Never done    AORTIC ANEURYSM SCREENING (SYSTEM ASSIGNED)  Never done    MEDICARE ANNUAL WELLNESS VISIT  03/08/2024    INFLUENZA VACCINE (1) 09/01/2024    COVID-19 Vaccine (7 - 2024-25 season) 09/01/2024    COLORECTAL CANCER SCREENING  12/19/2024    BMP  06/04/2025    LIPID  06/04/2025    TSH W/FREE T4 REFLEX  06/04/2025    FALL RISK ASSESSMENT  10/07/2025    GLUCOSE  06/04/2027    ADVANCE CARE PLANNING  04/10/2028    DTAP/TDAP/TD IMMUNIZATION (4 - Td or Tdap) 01/18/2031    RSV VACCINE (1 -  1-dose 75+ series) 12/27/2033    HEPATITIS C SCREENING  Completed    HIV SCREENING  Completed    PHQ-2 (once per calendar year)  Completed    ZOSTER IMMUNIZATION  Completed    HPV IMMUNIZATION  Aged Out    MENINGITIS IMMUNIZATION  Aged Out    RSV MONOCLONAL ANTIBODY  Aged Out    LUNG CANCER SCREENING  Discontinued            Objective    Exam  /84 (BP Location: Left arm, Patient Position: Sitting, Cuff Size: Adult Regular)   Pulse 68   Temp 98.3  F (36.8  C) (Oral)   Resp 12   Ht 1.829 m (6')   Wt 97.7 kg (215 lb 6.4 oz)   SpO2 97%   BMI 29.21 kg/m     Estimated body mass index is 29.21 kg/m  as calculated from the following:    Height as of this encounter: 1.829 m (6').    Weight as of this encounter: 97.7 kg (215 lb 6.4 oz).    Physical Exam  General: Alert, conversant, cooperative. No acute distress. .  Head, Eyes, Ears, Nose & Throat: Head without evidence of trauma. EOMI. MMM. .  Skin: Warm & dry. No rashes. .  Neurologic: Moving all extremities. .  Psychiatric: Normal affect..          10/7/2024   Mini Cog   Clock Draw Score 2 Normal   3 Item Recall 3 objects recalled   Mini Cog Total Score 5            Vision Screen  Vision Screen Results: (!) REFER      Signed Electronically by: Iglesia Obando MD

## 2024-10-08 RX ORDER — TADALAFIL 10 MG/1
10 TABLET ORAL EVERY 24 HOURS
Qty: 60 TABLET | Refills: 0 | Status: SHIPPED | OUTPATIENT
Start: 2024-10-08

## 2024-10-14 ENCOUNTER — ORDERS ONLY (AUTO-RELEASED) (OUTPATIENT)
Dept: FAMILY MEDICINE | Facility: CLINIC | Age: 64
End: 2024-10-14
Payer: COMMERCIAL

## 2024-10-14 DIAGNOSIS — I49.3 PVC'S (PREMATURE VENTRICULAR CONTRACTIONS): ICD-10-CM

## 2024-10-14 DIAGNOSIS — R00.2 PALPITATIONS: ICD-10-CM

## 2024-10-16 ENCOUNTER — ANCILLARY PROCEDURE (OUTPATIENT)
Dept: ULTRASOUND IMAGING | Facility: CLINIC | Age: 64
End: 2024-10-16
Attending: FAMILY MEDICINE
Payer: COMMERCIAL

## 2024-10-16 DIAGNOSIS — Z13.6 SCREENING FOR AAA (ABDOMINAL AORTIC ANEURYSM): ICD-10-CM

## 2024-10-16 PROCEDURE — 76775 US EXAM ABDO BACK WALL LIM: CPT | Mod: GC | Performed by: RADIOLOGY

## 2024-11-18 LAB — CV ZIO PRELIM RESULTS: NORMAL

## 2024-12-31 ENCOUNTER — MYC REFILL (OUTPATIENT)
Dept: INTERNAL MEDICINE | Facility: CLINIC | Age: 64
End: 2024-12-31
Payer: COMMERCIAL

## 2024-12-31 ENCOUNTER — MYC MEDICAL ADVICE (OUTPATIENT)
Dept: FAMILY MEDICINE | Facility: CLINIC | Age: 64
End: 2024-12-31
Payer: COMMERCIAL

## 2024-12-31 ENCOUNTER — MYC REFILL (OUTPATIENT)
Dept: FAMILY MEDICINE | Facility: CLINIC | Age: 64
End: 2024-12-31
Payer: COMMERCIAL

## 2024-12-31 DIAGNOSIS — R35.1 NOCTURIA: ICD-10-CM

## 2024-12-31 DIAGNOSIS — K21.00 GASTROESOPHAGEAL REFLUX DISEASE WITH ESOPHAGITIS WITHOUT HEMORRHAGE: ICD-10-CM

## 2024-12-31 DIAGNOSIS — N52.9 ERECTILE DYSFUNCTION, UNSPECIFIED ERECTILE DYSFUNCTION TYPE: ICD-10-CM

## 2024-12-31 RX ORDER — TADALAFIL 10 MG/1
10 TABLET ORAL EVERY 24 HOURS
Qty: 60 TABLET | Refills: 0 | Status: SHIPPED | OUTPATIENT
Start: 2024-12-31

## 2024-12-31 RX ORDER — TAMSULOSIN HYDROCHLORIDE 0.4 MG/1
0.4 CAPSULE ORAL AT BEDTIME
Qty: 90 CAPSULE | Refills: 0 | Status: SHIPPED | OUTPATIENT
Start: 2024-12-31

## 2024-12-31 RX ORDER — TAMSULOSIN HYDROCHLORIDE 0.4 MG/1
0.4 CAPSULE ORAL AT BEDTIME
Qty: 90 CAPSULE | Refills: 0 | Status: CANCELLED | OUTPATIENT
Start: 2024-12-31

## 2024-12-31 RX ORDER — OMEPRAZOLE 40 MG/1
CAPSULE, DELAYED RELEASE ORAL
Qty: 90 CAPSULE | Refills: 0 | Status: SHIPPED | OUTPATIENT
Start: 2024-12-31

## 2024-12-31 NOTE — TELEPHONE ENCOUNTER
"Last seen 10/7/24    Request for medication refill:    omeprazole (PRILOSEC) 40 MG DR capsule   tadalafil (CIALIS) 10 MG tablet   tamsulosin (FLOMAX) 0.4 MG capsule     Providers if patient needs an appointment and you are willing to give a one month supply please refill for one month and  send a letter/MyChart using \".SMILLIMITEDREFILL\" .smillimited and route chart to \"[facility] \" (Giving one month refill in non controlled medications is strongly recommended before denial)    If refill has been denied, meaning absolutely no refills without visit, please complete the smart phrase \".smirxrefuse\" and route it to the \"P [facility] MED REFILLS\"  pool to inform the patient and the pharmacy.    Colleen Jasmine RN    "

## 2024-12-31 NOTE — TELEPHONE ENCOUNTER
Refill request was sent in separate encounter. Please see MyCRefill from 12/31/24    Colleen Jasmine RN

## 2025-03-05 ENCOUNTER — TRANSFERRED RECORDS (OUTPATIENT)
Dept: HEALTH INFORMATION MANAGEMENT | Facility: CLINIC | Age: 65
End: 2025-03-05
Payer: COMMERCIAL

## 2025-03-06 ENCOUNTER — TRANSFERRED RECORDS (OUTPATIENT)
Dept: HEALTH INFORMATION MANAGEMENT | Facility: CLINIC | Age: 65
End: 2025-03-06
Payer: COMMERCIAL

## 2025-03-25 ENCOUNTER — DOCUMENTATION ONLY (OUTPATIENT)
Dept: FAMILY MEDICINE | Facility: CLINIC | Age: 65
End: 2025-03-25
Payer: COMMERCIAL

## 2025-03-25 NOTE — PROGRESS NOTES
"When opening a documentation only encounter, be sure to enter in \"Chief Complaint\" Forms and in \" Comments\" Title of form, description if needed.    Raj is a 64 year old  male  Form received via: Fax  Form now resides in: Provider Ready    Viridiana Sparks CMA                "

## 2025-04-01 ENCOUNTER — MYC REFILL (OUTPATIENT)
Dept: FAMILY MEDICINE | Facility: CLINIC | Age: 65
End: 2025-04-01
Payer: COMMERCIAL

## 2025-04-01 DIAGNOSIS — I10 ESSENTIAL HYPERTENSION: ICD-10-CM

## 2025-04-02 RX ORDER — LOSARTAN POTASSIUM AND HYDROCHLOROTHIAZIDE 12.5; 5 MG/1; MG/1
1 TABLET ORAL DAILY
Qty: 90 TABLET | Refills: 2 | Status: SHIPPED | OUTPATIENT
Start: 2025-04-02

## 2025-04-02 NOTE — TELEPHONE ENCOUNTER
"Request for medication refill:    Medication Name: losartan-hydrochlorothiazide (HYZAAR) 50-12.5 MG tablet     Providers if patient needs an appointment and you are willing to give a one month supply please refill for one month and  send a MyChart using \".SMILLIMITEDREFILL\" .Or route chart to \"P [facility] \" . To call patient and inform of limited refill and providers request to make an appointment. (Giving one month refill in non controlled medications is strongly recommended before denial)    If refill has been denied, meaning absolutely no refills without visit, please complete the smart phrase \".SMIRXREFUSE\" and route it to the \"P [facility] MED REFILLS\"  pool to inform the patient and the pharmacy.    Dayron Grove MA     "

## 2025-05-01 ENCOUNTER — MYC REFILL (OUTPATIENT)
Dept: FAMILY MEDICINE | Facility: CLINIC | Age: 65
End: 2025-05-01
Payer: COMMERCIAL

## 2025-05-01 DIAGNOSIS — E03.9 ACQUIRED HYPOTHYROIDISM: ICD-10-CM

## 2025-05-01 RX ORDER — LEVOTHYROXINE SODIUM 137 UG/1
TABLET ORAL
Qty: 90 TABLET | Refills: 3 | Status: SHIPPED | OUTPATIENT
Start: 2025-05-01

## 2025-05-27 ENCOUNTER — MYC REFILL (OUTPATIENT)
Dept: FAMILY MEDICINE | Facility: CLINIC | Age: 65
End: 2025-05-27
Payer: COMMERCIAL

## 2025-05-27 DIAGNOSIS — E78.00 HYPERCHOLESTEROLEMIA: ICD-10-CM

## 2025-05-27 RX ORDER — ATORVASTATIN CALCIUM 40 MG/1
40 TABLET, FILM COATED ORAL DAILY
Qty: 90 TABLET | Refills: 2 | Status: SHIPPED | OUTPATIENT
Start: 2025-05-27

## 2025-06-04 ENCOUNTER — DOCUMENTATION ONLY (OUTPATIENT)
Dept: FAMILY MEDICINE | Facility: CLINIC | Age: 65
End: 2025-06-04
Payer: COMMERCIAL